# Patient Record
Sex: FEMALE | NOT HISPANIC OR LATINO | URBAN - METROPOLITAN AREA
[De-identification: names, ages, dates, MRNs, and addresses within clinical notes are randomized per-mention and may not be internally consistent; named-entity substitution may affect disease eponyms.]

---

## 2017-05-08 ENCOUNTER — OUTPATIENT (OUTPATIENT)
Dept: OUTPATIENT SERVICES | Facility: HOSPITAL | Age: 37
LOS: 1 days | End: 2017-05-08

## 2017-12-11 ENCOUNTER — RESULT REVIEW (OUTPATIENT)
Age: 37
End: 2017-12-11

## 2020-09-09 ENCOUNTER — APPOINTMENT (OUTPATIENT)
Dept: ULTRASOUND IMAGING | Facility: CLINIC | Age: 40
End: 2020-09-09

## 2020-09-09 ENCOUNTER — APPOINTMENT (OUTPATIENT)
Dept: MAMMOGRAPHY | Facility: CLINIC | Age: 40
End: 2020-09-09

## 2021-03-16 PROBLEM — Z00.00 ENCOUNTER FOR PREVENTIVE HEALTH EXAMINATION: Status: ACTIVE | Noted: 2021-03-16

## 2021-03-18 ENCOUNTER — APPOINTMENT (OUTPATIENT)
Dept: ULTRASOUND IMAGING | Facility: CLINIC | Age: 41
End: 2021-03-18

## 2021-03-18 ENCOUNTER — APPOINTMENT (OUTPATIENT)
Dept: MAMMOGRAPHY | Facility: CLINIC | Age: 41
End: 2021-03-18

## 2021-03-23 ENCOUNTER — APPOINTMENT (OUTPATIENT)
Dept: BREAST CENTER | Facility: CLINIC | Age: 41
End: 2021-03-23
Payer: COMMERCIAL

## 2021-03-23 VITALS
HEIGHT: 62 IN | SYSTOLIC BLOOD PRESSURE: 112 MMHG | WEIGHT: 117 LBS | TEMPERATURE: 98.4 F | BODY MASS INDEX: 21.53 KG/M2 | HEART RATE: 72 BPM | DIASTOLIC BLOOD PRESSURE: 69 MMHG

## 2021-03-23 DIAGNOSIS — N60.02 SOLITARY CYST OF RIGHT BREAST: ICD-10-CM

## 2021-03-23 DIAGNOSIS — Z87.42 PERSONAL HISTORY OF OTHER DISEASES OF THE FEMALE GENITAL TRACT: ICD-10-CM

## 2021-03-23 DIAGNOSIS — Z78.9 OTHER SPECIFIED HEALTH STATUS: ICD-10-CM

## 2021-03-23 DIAGNOSIS — N60.01 SOLITARY CYST OF RIGHT BREAST: ICD-10-CM

## 2021-03-23 DIAGNOSIS — Z72.89 OTHER PROBLEMS RELATED TO LIFESTYLE: ICD-10-CM

## 2021-03-23 DIAGNOSIS — Z80.41 FAMILY HISTORY OF MALIGNANT NEOPLASM OF OVARY: ICD-10-CM

## 2021-03-23 DIAGNOSIS — R92.8 OTHER ABNORMAL AND INCONCLUSIVE FINDINGS ON DIAGNOSTIC IMAGING OF BREAST: ICD-10-CM

## 2021-03-23 DIAGNOSIS — Z80.1 FAMILY HISTORY OF MALIGNANT NEOPLASM OF TRACHEA, BRONCHUS AND LUNG: ICD-10-CM

## 2021-03-23 DIAGNOSIS — Z80.3 FAMILY HISTORY OF MALIGNANT NEOPLASM OF BREAST: ICD-10-CM

## 2021-03-23 DIAGNOSIS — Z63.5 DISRUPTION OF FAMILY BY SEPARATION AND DIVORCE: ICD-10-CM

## 2021-03-23 PROCEDURE — 19001 PUNCTURE ASPIR CYST BRST EA: CPT

## 2021-03-23 PROCEDURE — 99243 OFF/OP CNSLTJ NEW/EST LOW 30: CPT | Mod: 25

## 2021-03-23 PROCEDURE — 19000 PUNCTURE ASPIR CYST BREAST: CPT

## 2021-03-23 PROCEDURE — 99072 ADDL SUPL MATRL&STAF TM PHE: CPT

## 2021-03-23 RX ORDER — ALPRAZOLAM 0.25 MG/1
0.25 TABLET ORAL
Refills: 0 | Status: ACTIVE | COMMUNITY

## 2021-03-23 SDOH — SOCIAL STABILITY - SOCIAL INSECURITY: DISRUPTION OF FAMILY BY SEPARATION AND DIVORCE: Z63.5

## 2021-03-23 NOTE — ASSESSMENT
[FreeTextEntry1] : 40 year old female referred for consultation for abnormal u/s per Dr. Francisco Rodriguez.  Pt is able to feel a large breast lump which is uncomfortable, however she doesn't usually do BSE because it makes her anxious and they are very lumpy. She also has recently noticed a small lump superficial to the larger right one. \par \par 3/11/21 SB ELIH, Micheal dMMG: cluster of microcalc, innumerable masslike foci in both breasts, largest in R 12:00 augustin to area of clinical cancer, heterogeneously dense, rec 6 mos R U/S, BR3\par 3/11/21 SB ELIH, Micheal U/S: R approx 10:00 cyst with debris at area of interest just beneath nipple 5.6x4.5x2.1cm, 12:00 8mm cyst roughly same location, another 8mm focus which might be a smooth solid mass or complex cyst, 1:00 1.2cm cyst 3N, 5:00 2.2cm cyst 2N, L 1:00 1N 1.8cm cyst, 2:00 1N 5mm hypoechoic nodule, 6:00 couple of cysts 1N 1.9cm and 2.2cm, rec 6 mos. R U/S, BR3 \par \par Fhx:breast ca: mAunt x2-50's (one of them also had blood cancer), Mcousin 30's (also had lung cancer), Ovarian cancer MGM 84, Lung, MGF 80, MGM ?age, cervical Mother 51 y/o, Muncle ? age.  Pt has one daughter and one son. No fhx of genetic testing. \par Pt meets NCCN criteria. \par CBE: Large palpable right mass corresp to cyst. Left 1:00 and 6:00 cysts slightly palpable. NO axillary or SC lymphadenopathy.\par Reviewed option of aspiration of cyst, pt desires aspiration of the larger right one and adjacent 1:00 cyst.  40 ml asp of large cyst and 1 ml of smaller cyst 1:00, Right, full resolutions.  S/p aspiration of cyst x2.\par Reviewed Fhx and rec was for genetic testing. \par TC score also calculated and found to be 14.6 LT risk.

## 2021-03-23 NOTE — PAST MEDICAL HISTORY
[Menarche Age ____] : age at menarche was [unfilled] [Total Preg ___] : G[unfilled] [Living ___] : Living: [unfilled] [Age At Live Birth ___] : Age at live birth: [unfilled] [FreeTextEntry6] : no [FreeTextEntry7] : no [FreeTextEntry8] : no

## 2021-03-23 NOTE — HISTORY OF PRESENT ILLNESS
[FreeTextEntry1] : 40 year old female referred for consultation for abnormal u/s per Dr. Francisco Rodriguez.  Pt is able to feel a large breast lump, however she doesn't usually do BSE because it makes her anxious and they are very lumpy.\par \par 3/11/21 SB ELIH, Micheal dMMG: cluster of microcalc, innumerable masslike foci in both breasts, largest in R 12:00 augustin to area of clinical cancer, heterogeneously dense, rec 6 mos R U/S, BR3\par 3/11/21 SB ELIH, Micheal U/S: R approx 10:00 cyst with debris at area of interest just beneath nipple 5.6x4.5x2.1cm, 12:00 8mm cyst roughly same location, another 8mm focus which might be a smooth solid mass or complex cyst, 1:00 1.2cm cyst 3N, 5:00 2.2cm cyst 2N, L 1:00 1N 1.8cm cyst, 2:00 1N 5mm hypoechoic nodule, 6:00 couple of cysts 1N 1.9cm and 2.2cm, rec 6 mos. R U/S, BR3 \par \par Fhx:breast ca: mAunt x2-50's (one of them also had blood cancer), Mcousin 30's (also had lung cancer), Ovarian cancer MGM 84, Lung, MGF 80, MGM ?age, cervical Mother 49 y/o, Muncle ? age.  Pt has one daughter and one son. No fhx of genetic testing. \par Pt meets NCCN criteria. \par \par \par

## 2021-03-23 NOTE — PROCEDURE
[FreeTextEntry1] : Right breast cyst aspiration [FreeTextEntry2] : cyst [FreeTextEntry3] : Procedure for aspiration was discussed with patient and consent was signed.  The R breast was prepped with alcohol.  A 21 gauge needle used for lidocaine injection 1 ml, and 18 gauge used for aspiration of palpable cyst of 40 ml brownish nonbloody fluid, non suspicious with full resolution of the cyst and discarded. The aspiration also included the superficial new "lump" which was also found to be a cyst.  Additional aspiration of the 1:00 cyst done with yellowish fluid and resolution.  The patient tolerated the procedure well and hemostasis was excellent. A bandage was placed over the site.  Discussed cyst may reform and she may additional cysts in the future. \par

## 2021-03-23 NOTE — PHYSICAL EXAM
[Bra Size: ___] : Bra Size: [unfilled] [Normocephalic] : normocephalic [Atraumatic] : atraumatic [Supple] : supple [No Supraclavicular Adenopathy] : no supraclavicular adenopathy [Examined in the supine and seated position] : examined in the supine and seated position [Asymmetrical] : asymmetrical [No dominant masses] : no dominant masses left breast [No Nipple Retraction] : no left nipple retraction [No Nipple Discharge] : no left nipple discharge [No Axillary Lymphadenopathy] : no left axillary lymphadenopathy [No Edema] : no edema [No Swelling] : no swelling [Full ROM] : full range of motion [No Rashes] : no rashes [No Ulceration] : no ulceration [de-identified] : 10-12:00 large breast mass corresponds to cyst.  [de-identified] : Left 1:00 and 6:00 mass corresponds to cyst.

## 2021-03-23 NOTE — DATA REVIEWED
[FreeTextEntry1] : 3/11/21 Micheal DOYLE dMMG: cluster of microcalc, innumerable masslike foci in both breasts, largest in R 12:00 augustin to area of clinical cancer, heterogeneously dense, rec 6 mos R U/S, BR3\par 3/11/21 CHRISTIN GARZA Micheal U/S: R approx 10:00 cyst with debris at area of interest just beneath nipple 5.6x4.5x2.1cm, 12:00 8mm cyst roughly same location, another 8mm focus which might be a smooth solid mass or complex cyst, 1:00 1.2cm cyst 3N, 5:00 2.2cm cyst 2N, L 1:00 1N 1.8cm cyst, 2:00 1N 5mm hypoechoic nodule, 6:00 couple of cysts 1N 1.9cm and 2.2cm, rec 6 mos. R U/S, BR3 \par \par

## 2021-03-23 NOTE — REVIEW OF SYSTEMS
[Anxiety] : anxiety [Negative] : Heme/Lymph [Breast Pain] : breast pain [Breast Lump] : breast lump [de-identified] : A

## 2021-03-31 ENCOUNTER — NON-APPOINTMENT (OUTPATIENT)
Age: 41
End: 2021-03-31

## 2021-09-03 ENCOUNTER — RESULT REVIEW (OUTPATIENT)
Age: 41
End: 2021-09-03

## 2021-10-15 ENCOUNTER — APPOINTMENT (OUTPATIENT)
Dept: ULTRASOUND IMAGING | Facility: CLINIC | Age: 41
End: 2021-10-15
Payer: COMMERCIAL

## 2021-10-15 ENCOUNTER — RESULT REVIEW (OUTPATIENT)
Age: 41
End: 2021-10-15

## 2021-10-15 PROCEDURE — 76642 ULTRASOUND BREAST LIMITED: CPT | Mod: RT

## 2021-11-23 ENCOUNTER — APPOINTMENT (OUTPATIENT)
Dept: BREAST CENTER | Facility: CLINIC | Age: 41
End: 2021-11-23

## 2021-11-23 NOTE — HISTORY OF PRESENT ILLNESS
[FreeTextEntry1] : 41 year old [facility] panel negative female here for f/u for abnormal u/s. \par Pt is able to feel a large breast lump which is uncomfortable, however she doesn't usually do BSE because it makes her anxious and they are very lumpy. She also has recently noticed a small lump superficial to the larger right one. \par referred by Dr. Francisco Rodriguez\par \par 3/11/21 SB ELIH, Micheal dMMG: cluster of microcalc, innumerable masslike foci in both breasts, largest in R 12:00 augustin to area of clinical cancer, heterogeneously dense, rec 6 mos R U/S, BR3\par 3/11/21 SB ELIH, Micheal U/S: R approx 10:00 cyst with debris at area of interest just beneath nipple 5.6x4.5x2.1cm, 12:00 8mm cyst roughly same location, another 8mm focus which might be a smooth solid mass or complex cyst, 1:00 1.2cm cyst 3N, 5:00 2.2cm cyst 2N, L 1:00 1N 1.8cm cyst, 2:00 1N 5mm hypoechoic nodule, 6:00 couple of cysts 1N 1.9cm and 2.2cm, rec 6 mos. R U/S, BR3 \par 10/15/21, NFR, R U/S: 1.7x1.1cm lobulated cyst 12:00 1N inc from 3/21, [id number] cyst 10:00 1N dec from 3/21, 8x6mm cyst w debris at 11:00 1N, 2.2x0.8x2.8cm cyst 6:00 1N, rec 6 mos f/u U/S, BR3\par \par Fhx:breast ca: mAunt x2-50's (one of them also had blood cancer), Mcousin 30's (also had lung cancer), Ovarian cancer MGM 86, Lung, MGF 80, MGM ?age, cervical Mother 49 y/o, Muncle ? age. Pt has one daughter and one son. No fhx of genetic testing. \par Pt meets NCCN criteria. \par CBE: Large palpable right mass corresp to cyst. Left 1:00 and 6:00 cysts slightly palpable. NO axillary or SC lymphadenopathy.\par Reviewed option of aspiration of cyst, pt desires aspiration of the larger right one and adjacent 1:00 cyst. 40 ml asp of large cyst and 1 ml of smaller cyst 1:00, Right, full resolutions. S/p aspiration of cyst x2.\par Reviewed Fhx and rec was for genetic testing. \par TC score also calculated and found to be 14.6 LT risk. \par \par  \par Plan\par S/p cyst aspiration of right breast.\par [facility] genetic testing today.\par Further management pending results.\par Pt will also for 6 mos f.u bilat u/s 9/21 then f.u NP. \par F.u sooner if cysts recur, enlarge or PRN.

## 2022-02-04 ENCOUNTER — EMERGENCY (EMERGENCY)
Facility: HOSPITAL | Age: 42
LOS: 1 days | Discharge: HOME | End: 2022-02-04
Admitting: EMERGENCY MEDICINE
Payer: MEDICAID

## 2022-02-04 PROCEDURE — 93010 ELECTROCARDIOGRAM REPORT: CPT

## 2022-02-04 PROCEDURE — 99284 EMERGENCY DEPT VISIT MOD MDM: CPT

## 2022-02-07 DIAGNOSIS — R00.2 PALPITATIONS: ICD-10-CM

## 2023-05-26 ENCOUNTER — APPOINTMENT (OUTPATIENT)
Dept: MAMMOGRAPHY | Facility: CLINIC | Age: 43
End: 2023-05-26

## 2023-05-26 ENCOUNTER — APPOINTMENT (OUTPATIENT)
Dept: ULTRASOUND IMAGING | Facility: CLINIC | Age: 43
End: 2023-05-26
Payer: MEDICAID

## 2023-05-26 PROCEDURE — 76641 ULTRASOUND BREAST COMPLETE: CPT | Mod: 50

## 2023-05-26 PROCEDURE — 77062 BREAST TOMOSYNTHESIS BI: CPT

## 2023-05-26 PROCEDURE — 77066 DX MAMMO INCL CAD BI: CPT

## 2023-06-02 ENCOUNTER — APPOINTMENT (OUTPATIENT)
Dept: ULTRASOUND IMAGING | Facility: CLINIC | Age: 43
End: 2023-06-02

## 2023-06-02 ENCOUNTER — OUTPATIENT (OUTPATIENT)
Dept: OUTPATIENT SERVICES | Facility: HOSPITAL | Age: 43
LOS: 1 days | End: 2023-06-02
Payer: MEDICAID

## 2023-06-02 DIAGNOSIS — Z00.8 ENCOUNTER FOR OTHER GENERAL EXAMINATION: ICD-10-CM

## 2023-06-02 PROCEDURE — A4648: CPT

## 2023-06-02 PROCEDURE — 88341 IMHCHEM/IMCYTCHM EA ADD ANTB: CPT

## 2023-06-02 PROCEDURE — 88360 TUMOR IMMUNOHISTOCHEM/MANUAL: CPT | Mod: 26

## 2023-06-02 PROCEDURE — 88342 IMHCHEM/IMCYTCHM 1ST ANTB: CPT

## 2023-06-02 PROCEDURE — 77065 DX MAMMO INCL CAD UNI: CPT | Mod: 26,LT

## 2023-06-02 PROCEDURE — 88305 TISSUE EXAM BY PATHOLOGIST: CPT

## 2023-06-02 PROCEDURE — 19083 BX BREAST 1ST LESION US IMAG: CPT | Mod: LT

## 2023-06-02 PROCEDURE — 88342 IMHCHEM/IMCYTCHM 1ST ANTB: CPT | Mod: 26

## 2023-06-02 PROCEDURE — 88360 TUMOR IMMUNOHISTOCHEM/MANUAL: CPT

## 2023-06-02 PROCEDURE — 19083 BX BREAST 1ST LESION US IMAG: CPT

## 2023-06-02 PROCEDURE — 88305 TISSUE EXAM BY PATHOLOGIST: CPT | Mod: 26

## 2023-06-02 PROCEDURE — 77065 DX MAMMO INCL CAD UNI: CPT

## 2023-06-02 PROCEDURE — 88341 IMHCHEM/IMCYTCHM EA ADD ANTB: CPT | Mod: 26

## 2023-06-13 ENCOUNTER — APPOINTMENT (OUTPATIENT)
Dept: BREAST CENTER | Facility: CLINIC | Age: 43
End: 2023-06-13
Payer: MEDICAID

## 2023-06-13 VITALS
WEIGHT: 110 LBS | DIASTOLIC BLOOD PRESSURE: 75 MMHG | HEART RATE: 68 BPM | OXYGEN SATURATION: 98 % | BODY MASS INDEX: 20.24 KG/M2 | SYSTOLIC BLOOD PRESSURE: 113 MMHG | HEIGHT: 62 IN

## 2023-06-13 PROCEDURE — 99215 OFFICE O/P EST HI 40 MIN: CPT

## 2023-06-19 ENCOUNTER — APPOINTMENT (OUTPATIENT)
Dept: PLASTIC SURGERY | Facility: CLINIC | Age: 43
End: 2023-06-19
Payer: MEDICAID

## 2023-06-19 VITALS
HEIGHT: 62 IN | BODY MASS INDEX: 20.24 KG/M2 | TEMPERATURE: 98.3 F | OXYGEN SATURATION: 100 % | RESPIRATION RATE: 16 BRPM | DIASTOLIC BLOOD PRESSURE: 74 MMHG | SYSTOLIC BLOOD PRESSURE: 118 MMHG | WEIGHT: 110 LBS | HEART RATE: 73 BPM

## 2023-06-19 DIAGNOSIS — Z87.891 PERSONAL HISTORY OF NICOTINE DEPENDENCE: ICD-10-CM

## 2023-06-19 DIAGNOSIS — I49.9 CARDIAC ARRHYTHMIA, UNSPECIFIED: ICD-10-CM

## 2023-06-19 PROCEDURE — 99204 OFFICE O/P NEW MOD 45 MIN: CPT

## 2023-06-23 ENCOUNTER — APPOINTMENT (OUTPATIENT)
Dept: MRI IMAGING | Facility: CLINIC | Age: 43
End: 2023-06-23
Payer: MEDICAID

## 2023-06-23 PROCEDURE — A9579: CPT

## 2023-06-23 PROCEDURE — A9585: CPT

## 2023-06-23 PROCEDURE — 77049 MRI BREAST C-+ W/CAD BI: CPT

## 2023-06-26 ENCOUNTER — NON-APPOINTMENT (OUTPATIENT)
Age: 43
End: 2023-06-26

## 2023-06-26 DIAGNOSIS — R92.8 OTHER ABNORMAL AND INCONCLUSIVE FINDINGS ON DIAGNOSTIC IMAGING OF BREAST: ICD-10-CM

## 2023-06-28 ENCOUNTER — NON-APPOINTMENT (OUTPATIENT)
Age: 43
End: 2023-06-28

## 2023-06-30 PROBLEM — Z87.891 HISTORY OF USE OF NICOTINE CONTAINING SUBSTANCE IN COMBUSTION-FREE VAPORIZATION DEVICE: Status: ACTIVE | Noted: 2023-06-30

## 2023-06-30 NOTE — HISTORY OF PRESENT ILLNESS
[FreeTextEntry1] : This is a 44 yo woman referred by Dr. 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 \par MRI is planned for later this week.\par Pt reports she desires bilateral mastectomy, although she says she really just wishes she didn't have to deal with this at all.\par She also reports she would love to have a tummy tuck at the same time as she has been thinking about it for a while.

## 2023-06-30 NOTE — ASSESSMENT
[FreeTextEntry1] : Bilateral mastectomy is being planned, and pt is here for consultation regarding breast reconstruction.\par We discussed the spectrum of reconstructive options, including no reconstruction, implant based reconstruction and autologous reconstruction. We discussed the risk/benefit ratio for each of these options.\par We discussed at length the R/B/A of implant based reconstruction, including the need for a two-stage reconstruction. We discussed that implants are not considered lifetime devices, and may need to be replaced in the future. We discussed the risks of infection requiring implant removal, rupture, capsular contracture and implant exposure (especially following radiation).\par We briefly discussed the R/B/A of abdominally based autologous reconstruction, however, she does not have adequate tissue for this and does not want the added recovery of an autologous flap/second or third surgical site.\par \par She would like to proceed with implant based reconstruction. We discussed long term monitoring of implants for rupture and other concerns. We also discussed nipple sparing mastectomy; that the NAC may need to be removed intraoperatively if the perfusion is not good, or postoperatively if the pathology reports reveals disease too close to or involving the retro areolar tissue. I will review this with Dr. Mccarthy.\par \par We reviewed pain management. I recommend alternating Tylenol and ibuprofen every 4 hours (8 hours between doses of each) and then adding oxycodone for breakthrough pain as needed. Pt reported their understanding. \par \par We reviewed drain care. They were allowed to practice with a sample drain and will be given a chart to record output. This includes reminder instructions as well. They were able to return demonstrate the process of emptying the drain.\par I will coordinate the OR timing with Dr. Mccarthy.\par

## 2023-06-30 NOTE — PHYSICAL EXAM
[NI] : Normal [Bra Size: _______] : Bra Size: [unfilled] [de-identified] : Anxious [de-identified] : Left breast with a palpable mass in the lateral aspect of the lower pole.\par It is about 2-3 cm wide and is mobile with respect to the chest wall. \par It is somewhat tethered to the skin though.\par Breasts have involutional change and very little volume.\par No ptosis. [de-identified] : Excellent muscle tone.\par Minimal adipose.\par Excess skin and striae. Skin drapes below the umbilicus on either side of the midline in the way of a garland or bunting. \par Tissue is not adequate for flap reconstruction. [de-identified] : Some excess adipose noted in lateral hips, thighs and buttocks. [de-identified] : Alternately in denial vs resigned to any treatment required.

## 2023-06-30 NOTE — REVIEW OF SYSTEMS
[Anxiety] : anxiety [Negative] : Respiratory [FreeTextEntry2] : Stressed about her diagnosis and losing weight. [FreeTextEntry7] : Poor appetite. Worried she might have an ulcer due to stress. She plans on seeing Dr. Rodriguez for this. [de-identified] : Stress, as above.

## 2023-06-30 NOTE — REASON FOR VISIT
[Consultation] : a consultation visit [FreeTextEntry1] : Patient states that on May 20th 2023, She noticed a lump and irritation on her right breast.

## 2023-07-07 ENCOUNTER — RESULT REVIEW (OUTPATIENT)
Age: 43
End: 2023-07-07

## 2023-07-07 ENCOUNTER — APPOINTMENT (OUTPATIENT)
Dept: ULTRASOUND IMAGING | Facility: CLINIC | Age: 43
End: 2023-07-07
Payer: MEDICAID

## 2023-07-07 PROCEDURE — 76642 ULTRASOUND BREAST LIMITED: CPT | Mod: RT

## 2023-07-20 ENCOUNTER — NON-APPOINTMENT (OUTPATIENT)
Age: 43
End: 2023-07-20

## 2023-07-21 ENCOUNTER — RESULT REVIEW (OUTPATIENT)
Age: 43
End: 2023-07-21

## 2023-07-21 ENCOUNTER — APPOINTMENT (OUTPATIENT)
Dept: PLASTIC SURGERY | Facility: HOSPITAL | Age: 43
End: 2023-07-21
Payer: MEDICAID

## 2023-07-21 ENCOUNTER — APPOINTMENT (OUTPATIENT)
Dept: BREAST CENTER | Facility: HOSPITAL | Age: 43
End: 2023-07-21

## 2023-07-21 PROCEDURE — 19357 TISS XPNDR PLMT BRST RCNSTJ: CPT | Mod: 50

## 2023-07-21 PROCEDURE — 15777 ACELLULAR DERM MATRIX IMPLT: CPT | Mod: 50

## 2023-07-21 PROCEDURE — 15860 IV NJX TST VASC FLO FLAP/GRF: CPT

## 2023-07-25 ENCOUNTER — APPOINTMENT (OUTPATIENT)
Dept: PLASTIC SURGERY | Facility: CLINIC | Age: 43
End: 2023-07-25
Payer: MEDICAID

## 2023-07-25 VITALS
SYSTOLIC BLOOD PRESSURE: 118 MMHG | BODY MASS INDEX: 20.61 KG/M2 | HEART RATE: 69 BPM | RESPIRATION RATE: 16 BRPM | OXYGEN SATURATION: 99 % | WEIGHT: 112 LBS | DIASTOLIC BLOOD PRESSURE: 70 MMHG | HEIGHT: 62 IN | TEMPERATURE: 98.4 F

## 2023-07-25 PROCEDURE — 99024 POSTOP FOLLOW-UP VISIT: CPT

## 2023-07-25 NOTE — ASSESSMENT
[FreeTextEntry1] : 44 y/o female POD # 4 s/p Bilateral mastectomies with placement of tissue expanders\par \par Healing well\par Patient's AGUSTIN drains are putting out approximately 50 cc per day for the past two days.  Explained that the goal is 30 or less for 24 hour total for two days in a row.  Will keep in touch with patient to see if drains can be removed this Thursday;continue to empty and record\par Follow ups weekly for the next three weeks \par Miralax for constipation, and increase po fluids\par Ambulate, go for walks, advised that after drain removal no lifting anything > 10 lbs, no straining, but keep arms mobile\par All questions and concerns addressed\par Plan and patient status as per Dr Dunn

## 2023-07-25 NOTE — REASON FOR VISIT
[Follow-Up: _____] : a [unfilled] follow-up visit [FreeTextEntry1] : Patient states her right drain hurts. She last took tylenol at 7 am.

## 2023-07-25 NOTE — PHYSICAL EXAM
[de-identified] : Well kempt with makeup on. In fairly good spirits [de-identified] : Bilateral tegaderm dressings in place, with no drainage noted, the Bob are in place, incisions are C/D/I, point tenderness at right chest wall where drain is entering the chest next to the expander.  Drain is working and milked, tegaderm replaced around the drain.\par No erythema, edema, or ecchymotic areas noted.  Nipples are viable, pink bilaterally.

## 2023-07-25 NOTE — HISTORY OF PRESENT ILLNESS
[FreeTextEntry1] : "My right drain site is causing me pain"\par \par Patient presents s/p nipple sparing bilateral mastectomies with placement of tissue expanders on 7/21/23.  Patient states she is feeling better today, but is constipated and has some discomfort at the right drain site.  She states that there is tenderness that causes stabbing pain and has made it difficult to sleep.  She states she has been recoding drain output and has been taking tylenol and motrin for the discomfort. No other complaints at this time. \par She would rather not take the oxycodone.

## 2023-07-25 NOTE — ADDENDUM
[FreeTextEntry1] : All medical record entries were at my direction and personally dictated by me (Dr. Charla Dunn). I have reviewed the chart and agree that the record accurately reflects my personal performance of the history, physical exam, assessment and plan.

## 2023-07-27 ENCOUNTER — APPOINTMENT (OUTPATIENT)
Dept: PLASTIC SURGERY | Facility: CLINIC | Age: 43
End: 2023-07-27
Payer: MEDICAID

## 2023-07-27 ENCOUNTER — NON-APPOINTMENT (OUTPATIENT)
Age: 43
End: 2023-07-27

## 2023-07-27 VITALS
WEIGHT: 112 LBS | TEMPERATURE: 98.5 F | OXYGEN SATURATION: 98 % | BODY MASS INDEX: 20.61 KG/M2 | RESPIRATION RATE: 16 BRPM | DIASTOLIC BLOOD PRESSURE: 60 MMHG | HEART RATE: 67 BPM | HEIGHT: 62 IN | SYSTOLIC BLOOD PRESSURE: 90 MMHG

## 2023-07-27 PROCEDURE — 99024 POSTOP FOLLOW-UP VISIT: CPT

## 2023-07-27 RX ORDER — OXYCODONE 5 MG/1
5 TABLET ORAL EVERY 6 HOURS
Qty: 5 | Refills: 0 | Status: DISCONTINUED | COMMUNITY
Start: 2023-07-22 | End: 2023-07-27

## 2023-08-01 NOTE — ADDENDUM
[FreeTextEntry1] : 6/23/23 [facility], MRI: R- far posterior UOQ R breast is an enhancing focus measuring 8mm. While this may represent artifactual background enhancement given the focal appearance targeted US is rec. In the absence of finding on US this is felt to be a benign finding. No addl site of susp enhancement R breast. L- Signal void from tissue marker placed at bx is seen on axial image 129. The marker is within an enhancing mass measuring 2.3cm (craniocaudad dimension) and 2cm (mediolateral dimension). This represents the bx proven malignancy. This is located in the lateral aspect approx 5:00. No addl sites of susp enhancement in the L breast. Axilla- no significant axillary or internal mammary lymphadenopathy. Impression: Focal enhancement far posterior UOQ of R breast may represent artifact. Targeted US is rec. In the absence of US findings this would be felt to be benign. Bx proven malignancy left breast without evidence of multifocal or multicentric dx. Rec addl imaging. BR0   7/7/23 NFR, R US: No susp solid mass. Scattered cysts are seen. No mass to correlate with MRI finding. MRI findings are felt to be artifactual. Rec surgical or oncological management. BR2  7/21/23 Surgical path: Right breast SA, negative, mastectomy: papilloma, Left SA is negative. Left mastectomy, 2.1 cm DCIS, IDC .5 mm noted, all margins negative, DCIS closest is 2 mm. ER/RI on core bx was negative/negative. Receptors on microinvasion: ER- 0%, RI- 0%, Her2 positive.

## 2023-08-01 NOTE — PHYSICAL EXAM
[Normocephalic] : normocephalic [Atraumatic] : atraumatic [Supple] : supple [No Supraclavicular Adenopathy] : no supraclavicular adenopathy [No Thyromegaly] : no thyromegaly [Examined in the supine and seated position] : examined in the supine and seated position [Bra Size: ___] : Bra Size: [unfilled] [No Nipple Retraction] : no left nipple retraction [No Nipple Discharge] : no left nipple discharge [No Axillary Lymphadenopathy] : no left axillary lymphadenopathy [No Edema] : no edema [No Swelling] : no swelling [Full ROM] : full range of motion [No Rashes] : no rashes [No Ulceration] : no ulceration [de-identified] : 6:00 palpable mass, cyst. Left 5:00 3N mass corresp to biopsied DCIS. Breast with multiple smaller nodularity limits CBE.

## 2023-08-01 NOTE — HISTORY OF PRESENT ILLNESS
[FreeTextEntry1] : Referred by Dr. Francisco Rodriguez.\par \par 43 year old, myriad negative, female here today after recent biopsy L 5:00 breast bx showed DCIS, ER/MN negative.\par Patient notes feeling a palpable lump approx 1 month ago which prompted her to have imaging. \par \par 3/11/21 SB ELIH, Micheal dMMG: cluster of microcalc, innumerable masslike foci in both breasts, largest in R 12:00 augustin to area of clinical cancer, heterogeneously dense, rec 6 mos R U/S, BR3\par 3/11/21 SB ELIH, Micheal U/S: R approx 10:00 cyst with debris at area of interest just beneath nipple 5.6x4.5x2.1cm, 12:00 8mm cyst roughly same location, another 8mm focus which might be a smooth solid mass or complex cyst, 1:00 1.2cm cyst 3N, 5:00 2.2cm cyst 2N, L 1:00 1N 1.8cm cyst, 2:00 1N 5mm hypoechoic nodule, 6:00 couple of cysts 1N 1.9cm and 2.2cm, rec 6 mos. R U/S, BR3 \par 10/15/21 NFR, R US: There is a 1.7 x 1.1 cm lobulated cyst at 12:00 o'clock, 1cmfn which is increased from 3/11/2021 report. There is an 8 x 6 x 9 mm cyst at 10:00 o'clock, 1 cmfn which is decreased from 3/11/2021 report. There is an 8 x 6 mm cyst with debris at 11:00 o'clock, 1cmfn There is a 2.2 x 0.8 x 2.8 cm cyst at 6:00 o'clock, 1cmfn. Rec US in 6 mos. BR3\par \par 5/26/23 NFR, bilat sMMG and US: TC 21.6%. Extremely dense. Palpable lump corresponds to an ill-defined density associated with pleomorphic calcs. There are bilateral well-circumscribed masses in this patient with known cysts. US: R- No suspicious solid mass. Numerous cysts are identified throughout the right breast with the largest measuring up to 4cm. L- In the 5:00 left breast is a 17mm hypoechoic mass with internal calcs corresponding to the mmg and palpable findings. Numerous cysts are identified throughout the left breast. Rec bx of palpable mass 5:00 L breast. BR4B\par \par 6/2/23 Larry, US guided bx L breast 5:00: DCIS G2-3, ER negative 0%, MN negative 0% papillary, solid cribriform pattern with lobular extension, comedo necrosis and calcs. Some area are suggestive of associated papillary lesions. Immunostains for P63 and smooth muscle myosin heavy chain highlight the myoepithelial cells, support the in situ nature of the lesion. Concordant. Rec surgical or oncological management. \par \par Fhx:breast ca: mAunt x2-50's (one of them also had blood cancer), Mcousin 30's (also had lung cancer), Ovarian cancer MGM 84, Lung, MGF 80, MGM ?age, cervical Mother 49 y/o, Muncle ? age. Pt has one daughter and one son.

## 2023-08-01 NOTE — CONSULT LETTER
[Dear  ___] : Dear  [unfilled], [Consult Letter:] : I had the pleasure of evaluating your patient, [unfilled]. [Please see my note below.] : Please see my note below. [Consult Closing:] : Thank you very much for allowing me to participate in the care of this patient.  If you have any questions, please do not hesitate to contact me. [Sincerely,] : Sincerely, [FreeTextEntry3] : Jacklyn Mccarthy MD

## 2023-08-01 NOTE — DATA REVIEWED
[FreeTextEntry1] : 5/26/23 NFR, bilat sMMG and US: TC 21.6%. Extremely dense. Palpable lump corresponds to an ill-defined density associated with pleomorphic calcs. There are bilateral well-circumscribed masses in this patient with known cysts. US: R- No suspicious solid mass. Numerous cysts are identified throughout the right breast with the largest measuring up to 4cm. L- In the 5:00 left breast is a 17mm hypoechoic mass with internal calcs corresponding to the mmg and palpable findings. Numerous cysts are identified throughout the left breast. Rec bx of palpable mass 5:00 L breast. BR4B\par \par 6/2/23 Larry, US guided bx L breast 5:00: DCIS G2-3, ER negative 0%, MI negative 0% papillary, solid cribriform pattern with lobular extension, comedo necrosis and calcs. Some area are suggestive of associated papillary lesions. Immunostains for P63 and smooth muscle myosin heavy chain highlight the myoepithelial cells, support the in situ nature of the lesion. Concordant. Rec surgical or oncological management.

## 2023-08-01 NOTE — ASSESSMENT
[FreeTextEntry1] : Referred by Dr. Francisco Rodriguez.\par \par 43 year old, myriad negative, female here today after recent biopsy L 5:00 breast bx showed DCIS, ER/RI negative.\par Patient notes feeling a palpable lump approx 1 month ago which prompted her to have imaging. \par \par 3/11/21 SB ELIH, Micheal dMMG: cluster of microcalc, innumerable masslike foci in both breasts, largest in R 12:00 augustin to area of clinical cancer, heterogeneously dense, rec 6 mos R U/S, BR3\par 3/11/21 SB ELIH, Micheal U/S: R approx 10:00 cyst with debris at area of interest just beneath nipple 5.6x4.5x2.1cm, 12:00 8mm cyst roughly same location, another 8mm focus which might be a smooth solid mass or complex cyst, 1:00 1.2cm cyst 3N, 5:00 2.2cm cyst 2N, L 1:00 1N 1.8cm cyst, 2:00 1N 5mm hypoechoic nodule, 6:00 couple of cysts 1N 1.9cm and 2.2cm, rec 6 mos. R U/S, BR3 \par 10/15/21 NFR, R US: There is a 1.7 x 1.1 cm lobulated cyst at 12:00 o'clock, 1cmfn which is increased from 3/11/2021 report. There is an 8 x 6 x 9 mm cyst at 10:00 o'clock, 1 cmfn which is decreased from 3/11/2021 report. There is an 8 x 6 mm cyst with debris at 11:00 o'clock, 1cmfn There is a 2.2 x 0.8 x 2.8 cm cyst at 6:00 o'clock, 1cmfn. Rec US in 6 mos. BR3\par \par 5/26/23 NFR, bilat sMMG and US: TC 21.6%. Extremely dense. Palpable lump corresponds to an ill-defined density associated with pleomorphic calcs. There are bilateral well-circumscribed masses in this patient with known cysts. US: R- No suspicious solid mass. Numerous cysts are identified throughout the right breast with the largest measuring up to 4cm. L- In the 5:00 left breast is a 17mm hypoechoic mass with internal calcs corresponding to the mmg and palpable findings. Numerous cysts are identified throughout the left breast. Rec bx of palpable mass 5:00 L breast. BR4B\par \par 6/2/23 Larry, US guided bx L breast 5:00: DCIS G2-3, ER negative 0%, RI negative 0% papillary, solid cribriform pattern with lobular extension, comedo necrosis and calcs. Some area are suggestive of associated papillary lesions. Immunostains for P63 and smooth muscle myosin heavy chain highlight the myoepithelial cells, support the in situ nature of the lesion. Concordant. Rec surgical or oncological management. \par \par Fhx:breast ca: mAunt x2-50's (one of them also had blood cancer), Mcousin 30's (also had lung cancer), Ovarian cancer MGM 84, Lung, MGF 80, MGM ?age, cervical Mother 51 y/o, Muncle ? age. Pt has one daughter and one son. \par Pt works as a  at [employer].\par \par CBE: 32 A 6:00 palpable mass, cyst. Left 5:00 3N mass corresp to biopsied DCIS. Breast with multiple smaller nodularity limits CBE. No axillary or SC lymphadenopathy.\par Long discussion with patient regarding the biopsy results from [facility] from 06/2/2023   showing DCIS of the L breast at the 5 o?clock, 3 cm FTN, Grade 3, ER/RI Neg, comedonec, measuring 1.7cm.  Reviewed extensively the options of mastectomy vs lumpectomy with the pros and cons for both.  \par With mastectomy, options of immediate or delayed reconstruction (expander/implant vs autologous flap) discussed and referral to plastic surgeon given. Pt is thin and may not be a candidate for KIMBERLY.  Will probably need a drain.  Also discussed usually no radiation with mastectomy but may need if positive margins or positive LNs if LN is indicated.  Discussed usually no reexcision with mastectomy. Also discussed Magtrace injection, including skin discoloration, for potential delayed SLN biopsy after mastectomy if microinvasion or invasion found on final pathology. \par With lumpectomy, will need radiation and treatment was reviewed.  May need localization.  Also discussed will need negative margins and if too close or positive, may need reexcision.   If unable to achieve negative margins with reexcision(s), She  may need mastectomy.  PT is an A cup and given size of breast and at least 1.7 cm area, cosmesis may not be optimal with initial lumpectomy. Will not need SLNBx with lumpectomy but if microinvasion or invasion found on final path, may need delayed SLNbx or axillary surgery.\par Discussed probably will not need hormonal therapy.  No chemotherapy for Stage 0, DCIS.  Prior genetic testing MYRIAD panel was negative. \par \par Pt is leaning towards left mastectomy and right mastectomy. She may be a candidate for NSM.\par Nipple sparing mastectomy risks: In additional to the usual risks of mastectomy, nipple sparing mastectomy carries potential additional risks of cancer recurrence/occurrence; although studied outcomes have been very good to date, there is limited long-term data available re: recurrence/occurrence rates whether or not they are equivalent with nipple sparing mastectomy. Would check SA tissue separately and if DCIS is found on final pathology, may need delayed removal of NAC. Finally one should be aware that the nipple will not retain sensation and will remain for the most part numb after nipple sparing mastectomy.\par Would rec MRI given limited CBE and to evaluate proximity to NAC of the DCIS. \par Pt to also see DR. Dunn about reconstruction. \par 
English

## 2023-08-02 ENCOUNTER — APPOINTMENT (OUTPATIENT)
Dept: PLASTIC SURGERY | Facility: CLINIC | Age: 43
End: 2023-08-02
Payer: MEDICAID

## 2023-08-02 ENCOUNTER — NON-APPOINTMENT (OUTPATIENT)
Age: 43
End: 2023-08-02

## 2023-08-02 VITALS
HEIGHT: 62 IN | TEMPERATURE: 98.6 F | RESPIRATION RATE: 16 BRPM | SYSTOLIC BLOOD PRESSURE: 90 MMHG | DIASTOLIC BLOOD PRESSURE: 64 MMHG | BODY MASS INDEX: 20.61 KG/M2 | WEIGHT: 112 LBS | OXYGEN SATURATION: 99 % | HEART RATE: 66 BPM

## 2023-08-02 PROCEDURE — 99024 POSTOP FOLLOW-UP VISIT: CPT

## 2023-08-02 NOTE — REASON FOR VISIT
[Follow-Up: _____] : a [unfilled] follow-up visit [FreeTextEntry1] : Patient states pain on the left side can't lift up arm.

## 2023-08-02 NOTE — ASSESSMENT
[FreeTextEntry1] : New pain at left TE, likely from pressure overnight. No hematoma or malposition. OK to go ahead with SLNBx with Dr. Mccarthy today. Follow up next week.

## 2023-08-02 NOTE — HISTORY OF PRESENT ILLNESS
[FreeTextEntry1] : Pt called this morning. She is due to have surgery with Dr. Mccarthy today (left sentinel lymph node biopsy), but she reports she slept on her left side and woke up with pain just lateral to the TE that radiated to her chest wall, back and left arm.

## 2023-08-02 NOTE — PHYSICAL EXAM
[de-identified] : Anxious [de-identified] : NL respiratory effort noted  [de-identified] : Bilateral steristrips in place, with no drainage noted, the Bob are in place, incisions are C/D/I, point tenderness at right chest wall where drain is entering the chest next to the expander.  Drain sites healing. No new hematoma or ecchymosis laterally. TE still in good position.

## 2023-08-03 ENCOUNTER — APPOINTMENT (OUTPATIENT)
Dept: BREAST CENTER | Facility: CLINIC | Age: 43
End: 2023-08-03

## 2023-08-07 ENCOUNTER — APPOINTMENT (OUTPATIENT)
Dept: PLASTIC SURGERY | Facility: CLINIC | Age: 43
End: 2023-08-07
Payer: MEDICAID

## 2023-08-07 VITALS
RESPIRATION RATE: 16 BRPM | BODY MASS INDEX: 20.43 KG/M2 | SYSTOLIC BLOOD PRESSURE: 92 MMHG | DIASTOLIC BLOOD PRESSURE: 60 MMHG | HEIGHT: 62 IN | WEIGHT: 111 LBS | HEART RATE: 63 BPM | TEMPERATURE: 98.3 F | OXYGEN SATURATION: 98 %

## 2023-08-07 DIAGNOSIS — Z86.19 PERSONAL HISTORY OF OTHER INFECTIOUS AND PARASITIC DISEASES: ICD-10-CM

## 2023-08-07 PROCEDURE — 99024 POSTOP FOLLOW-UP VISIT: CPT

## 2023-08-07 NOTE — PHYSICAL EXAM
[de-identified] : Well-dressed, well-kempt, but appears psychologically in distress. [de-identified] : bilateral tissue expanders in place, patient states right TE causes her some discomfort on her ribs, and the left TE is causing her to have difficulty with full range of motion in her arm and shoulder.  She also states she gets a weird feeling down her left arm on occasion as well. steri strips in place, C/D/I  nipples healthy and pink bilaterally  [de-identified] : s/p nipple sparing mastectomies

## 2023-08-07 NOTE — REASON FOR VISIT
[Follow-Up: _____] : a [unfilled] follow-up visit [FreeTextEntry1] : Patient states she is very overwhelmed with everything going on.

## 2023-08-07 NOTE — ADDENDUM
[FreeTextEntry1] : All medical record entries were at my direction and personally dictated by me (Dr. Charla Dunn). I have reviewed the chart and agree that the record accurately reflects my personal performance of the history, physical exam, assessment and plan.  Changes made to the note and assessment above as needed.

## 2023-08-07 NOTE — ASSESSMENT
[FreeTextEntry1] : Healing well postop. Advised pt to continue to utilize the services of her therapist to work through her feelings regarding her diagnosis and treatment. She is planning to proceed with the sentinel lymph node biopsy with Dr. Mccarthy on Thursday. She did see the cardiologist and knows the recommendation to take her metoprolol the day before, but not on the day of surgery. She is conflicted about taking the metoprolol as she had stopped taking it before since she did not think she needed it. She is not sure she wants to go through with the reconstruction either, but recognizes that she will need another surgery anyway, even if she just decides to remove the expanders. She reports that if she needs another surgery anyway, she might just go ahead with the second stage. I suggested that she not pressure herself to make that decision at this time,and allow herself some time to recover. Either way, I would not usually do the second stage until three months have passed from the first one.

## 2023-08-07 NOTE — HISTORY OF PRESENT ILLNESS
[FreeTextEntry1] : "I'm so done" Patient presents for follow up and is scheduled for her sentinel lymph node biopsy with Dr Mccarthy on Thursday.  She states that she is usually a very up-beat, positive person, but feels like she has hit a wall.  She states that she does not want to have surgery this week, that she doesnt know what to do about her tissue expanders.  SHe seems very upset and is concerned about many things.  She is speaking with a therapist, but seems that she is very frustrated about her current situation and diagnosis.

## 2023-08-10 ENCOUNTER — RESULT REVIEW (OUTPATIENT)
Age: 43
End: 2023-08-10

## 2023-08-10 ENCOUNTER — APPOINTMENT (OUTPATIENT)
Dept: BREAST CENTER | Facility: HOSPITAL | Age: 43
End: 2023-08-10

## 2023-08-15 ENCOUNTER — APPOINTMENT (OUTPATIENT)
Dept: PLASTIC SURGERY | Facility: CLINIC | Age: 43
End: 2023-08-15
Payer: MEDICAID

## 2023-08-15 VITALS
HEART RATE: 77 BPM | SYSTOLIC BLOOD PRESSURE: 114 MMHG | DIASTOLIC BLOOD PRESSURE: 75 MMHG | OXYGEN SATURATION: 98 % | HEIGHT: 62 IN | BODY MASS INDEX: 20.43 KG/M2 | RESPIRATION RATE: 16 BRPM | WEIGHT: 111 LBS | TEMPERATURE: 98 F

## 2023-08-15 DIAGNOSIS — Z42.1 ENCOUNTER FOR BREAST RECONSTRUCTION FOLLOWING MASTECTOMY: ICD-10-CM

## 2023-08-15 PROCEDURE — 99024 POSTOP FOLLOW-UP VISIT: CPT

## 2023-08-15 NOTE — REASON FOR VISIT
[Follow-Up: _____] : a [unfilled] follow-up visit [FreeTextEntry1] : Patient states she is still uncomfortable and lumpy some pain.

## 2023-08-15 NOTE — PHYSICAL EXAM
[de-identified] : Well-dressed, well-kempt, but appears psychologically in distress. [de-identified] : bilateral tissue expanders in place incisions healing well After alcohol prep, magnet localization and CHG prep, 25 ml fill performed without difficulty. Pt tolerated fairly well. She was disturbed by the distorted sensation of the breast skin. [de-identified] : No breasts, s/p nipple sparing mastectomy [de-identified] : Left axilla with dermabond in place.

## 2023-08-15 NOTE — HISTORY OF PRESENT ILLNESS
[FreeTextEntry1] : Pt reports she is still feeling very torn about doing reconstruction and upset with how this whole process is disrupting her life, being unable to work out, missing work and having pain. She is also disturbed by the "ugly, lumpy" appearance of the reconstructed breasts with the Bob in place.

## 2023-08-15 NOTE — ASSESSMENT
[FreeTextEntry1] : Stable and recovering well physically, but still struggling with her diagnosis and coping with the disruption caused by it and the associated physical changes and surgery. She feels that some of her anxiety will be relieved after she gets the results of the SLNBx. Weekly f/u to fill Bob. Second stage to wait at least 3 months after the mastectomy.  The weight of the mastectomy specimens was 113 grams on the right and 126 grams on the left. The tissue expanders are 275 cc expanders with 40 cc fill in the OR. The TE was filled with 25 cc on 8/15/23 for a total volume of 65 cc.

## 2023-08-17 ENCOUNTER — NON-APPOINTMENT (OUTPATIENT)
Age: 43
End: 2023-08-17

## 2023-08-21 ENCOUNTER — APPOINTMENT (OUTPATIENT)
Dept: PLASTIC SURGERY | Facility: CLINIC | Age: 43
End: 2023-08-21
Payer: MEDICAID

## 2023-08-21 VITALS
RESPIRATION RATE: 16 BRPM | TEMPERATURE: 98.3 F | OXYGEN SATURATION: 99 % | HEART RATE: 78 BPM | BODY MASS INDEX: 20.43 KG/M2 | HEIGHT: 62 IN | WEIGHT: 111 LBS | SYSTOLIC BLOOD PRESSURE: 111 MMHG | DIASTOLIC BLOOD PRESSURE: 70 MMHG

## 2023-08-21 PROCEDURE — 99024 POSTOP FOLLOW-UP VISIT: CPT

## 2023-08-21 NOTE — ASSESSMENT
[FreeTextEntry1] : 44 y/o female for follow up and filling of TE  Patient is healing well and tolerated the filling of TE bilaterally  Patient reports she is to see Dr Mccarthy tomorrow 8/22/2023 Weekly f/u to fill Bob Second stage to be scheduled at least 3 months s/p mastectomy  The weight of the mastectomy specimens was 113 grams on the right and 126 grams on the left. The tissue expanders are 275 cc expanders with 40 cc fill in the OR. The TE was filled with 25 cc on 8/15/23 for a total volume of 65 cc.The TE was filled with 25 cc on 8/21/2023 bilaterally for a total of 90cc.

## 2023-08-21 NOTE — HISTORY OF PRESENT ILLNESS
[FreeTextEntry1] : "I am still uncomfortable" Patient presents for follow up for filling of tissue expanders.  She states that she still does not like how they feel but seems to be in better spirits.  No complaints.

## 2023-08-21 NOTE — REASON FOR VISIT
[Follow-Up: _____] : a [unfilled] follow-up visit [FreeTextEntry1] : Patient states her breasts are just there no issues to report.

## 2023-08-21 NOTE — PHYSICAL EXAM
[de-identified] : Bilateral tissue expanders 25 cc additional added to each TE without complication Steristrips removed. incisions well healed. [de-identified] : s/p nipple sparing bilateral mastectomies

## 2023-08-22 ENCOUNTER — APPOINTMENT (OUTPATIENT)
Dept: BREAST CENTER | Facility: CLINIC | Age: 43
End: 2023-08-22
Payer: MEDICAID

## 2023-08-22 VITALS
HEIGHT: 62 IN | HEART RATE: 66 BPM | BODY MASS INDEX: 20.43 KG/M2 | DIASTOLIC BLOOD PRESSURE: 49 MMHG | SYSTOLIC BLOOD PRESSURE: 96 MMHG | TEMPERATURE: 98.1 F | WEIGHT: 111 LBS | OXYGEN SATURATION: 100 %

## 2023-08-22 PROCEDURE — 99024 POSTOP FOLLOW-UP VISIT: CPT

## 2023-08-22 NOTE — REASON FOR VISIT
[Follow-Up: _____] : a [unfilled] follow-up visit [FreeTextEntry1] : Patient states she has waves of feeling woozy and nausea for a little and some pain is still felt.

## 2023-08-22 NOTE — ASSESSMENT
[FreeTextEntry1] : Stable postop. Still having trouble adjusting to the diagnosis and post-surgical changes. Will continue to support her through her journey. Follow up weekly. Follow up with Dr. Mccarthy next week.

## 2023-08-22 NOTE — HISTORY OF PRESENT ILLNESS
[FreeTextEntry1] : Referred by Dr. Francisco Rodriguez.  43-year-old, myriad negative, female here today for post op visit s/p 8/10/23 Left SLN Bx after 7/21/23 R prophylactic and left therapeutic mastectomy for right DCIS showed microinvasion.  8/10/23 Surgical path: Left sentinel LN. Reactive LN with numerous siderophages (H T E and iron stain). Negative for microscopic metastases with an AE1/AE3 cytokeratin stain. 7/21/23 Surgical path: Right breast SA, negative, mastectomy: papilloma, Left SA is negative. Left mastectomy, 2.1 cm DCIS, IDC .5 mm noted, all margins negative, DCIS closest is 2 mm. ER/DE on core bx was negative/negative. Receptors on microinvasion: ER- 0%, DE- 0%, Her2 positive.  Doing well post op, no hematoma, no cellulitis.   3/11/21 SB ELIH, Micheal dMMG: cluster of microcalc, innumerable masslike foci in both breasts, largest in R 12:00 augustin to area of clinical cancer, heterogeneously dense, rec 6 mos R U/S, BR3  3/11/21 SB ELIH, Micheal U/S: R approx 10:00 cyst with debris at area of interest just beneath nipple 5.6x4.5x2.1cm, 12:00 8mm cyst roughly same location, another 8mm focus which might be a smooth solid mass or complex cyst, 1:00 1.2cm cyst 3N, 5:00 2.2cm cyst 2N, L 1:00 1N 1.8cm cyst, 2:00 1N 5mm hypoechoic nodule, 6:00 couple of cysts 1N 1.9cm and 2.2cm, rec 6 mos. R U/S, BR3  10/15/21 NFR, R US: There is a 1.7 x 1.1 cm lobulated cyst at 12:00 o'clock, 1cmfn which is increased from 3/11/2021 report. There is an 8 x 6 x 9 mm cyst at 10:00 o'clock, 1 cmfn which is decreased from 3/11/2021 report. There is an 8 x 6 mm cyst with debris at 11:00 o'clock, 1cmfn There is a 2.2 x 0.8 x 2.8 cm cyst at 6:00 o'clock, 1cmfn. Rec US in 6 mos. BR3  5/26/23 NFR, bilat sMMG and US: TC 21.6%. Extremely dense. Palpable lump corresponds to an ill-defined density associated with pleomorphic calcs. There are bilateral well-circumscribed masses in this patient with known cysts. US: R- No suspicious solid mass. Numerous cysts are identified throughout the right breast with the largest measuring up to 4cm. L- In the 5:00 left breast is a 17mm hypoechoic mass with internal calcs corresponding to the mmg and palpable findings. Numerous cysts are identified throughout the left breast. Rec bx of palpable mass 5:00 L breast. BR4B  6/2/23 Stanley, US guided bx L breast 5:00: DCIS G2-3, ER negative 0%, DE negative 0% papillary, solid cribriform pattern with lobular extension, comedo necrosis and calcs. Some area are suggestive of associated papillary lesions. Immunostains for P63 and smooth muscle myosin heavy chain highlight the myoepithelial cells, support the in situ nature of the lesion. Concordant. Rec surgical or oncological management.  6/23/23 [place], MRI: R- far posterior UOQ R breast is an enhancing focus measuring 8mm. While this may represent artifactual background enhancement given the focal appearance targeted US is rec. In the absence of finding on US this is felt to be a benign finding. No addl site of susp enhancement R breast. L- Signal void from tissue marker placed at bx is seen on axial image 129. The marker is within an enhancing mass measuring 2.3cm (craniocaudad dimension) and 2cm (mediolateral dimension). This represents the bx proven malignancy. This is located in the lateral aspect approx 5:00. No addl sites of susp enhancement in the L breast. Axilla- no significant axillary or internal mammary lymphadenopathy. Impression: Focal enhancement far posterior UOQ of R breast may represent artifact. Targeted US is rec. In the absence of US findings this would be felt to be benign. Bx proven malignancy left breast without evidence of multifocal or multicentric dx. Rec addl imaging. BR0  7/7/23 NFR, R US: No susp solid mass. Scattered cysts are seen. No mass to correlate with MRI finding. MRI findings are felt to be artifactual. Rec surgical or oncological management. BR2  Fhx: breast ca: mAunt x2-50's (one of them also had blood cancer), Mcousin 30's (also had lung cancer), Ovarian cancer MGM 84, Lung, MGF 80, MGM ?age, cervical Mother 51 y/o, Muncle ? age. Pt has one daughter and one son.

## 2023-08-22 NOTE — DATA REVIEWED
[FreeTextEntry1] : 8/10/23 Surgical path: Left sentinel LN. Reactive LN with numerous siderophages (H T E and iron stain). Negative for microscopic metastases with an AE1/AE3 cytokeratin stain.

## 2023-08-22 NOTE — ASSESSMENT
[FreeTextEntry1] : Referred by Dr. Francisco Rodriguez.  43-year-old, myriad negative, female here today for post op visit s/p 8/10/23 Left SLN Bx after 7/21/23 R prophylactic and left therapeutic mastectomy for right DCIS showed microinvasion.  8/10/23 Surgical path: Left sentinel LN. Reactive LN with numerous siderophages (H T E and iron stain). Negative for microscopic metastases with an AE1/AE3 cytokeratin stain. 7/21/23 Surgical path: Right breast SA, negative, mastectomy: papilloma, Left SA is negative. Left mastectomy, 2.1 cm DCIS, IDC .5 mm noted, all margins negative, DCIS closest is 2 mm. ER/TX on core bx was negative/negative. Receptors on microinvasion: ER- 0%, TX- 0%, Her2 positive.  Doing well post op, no hematoma, no cellulitis.   3/11/21 SB ELIH, Micheal dMMG: cluster of microcalc, innumerable masslike foci in both breasts, largest in R 12:00 augustin to area of clinical cancer, heterogeneously dense, rec 6 mos R U/S, BR3  3/11/21 SB ELIH, Micheal U/S: R approx 10:00 cyst with debris at area of interest just beneath nipple 5.6x4.5x2.1cm, 12:00 8mm cyst roughly same location, another 8mm focus which might be a smooth solid mass or complex cyst, 1:00 1.2cm cyst 3N, 5:00 2.2cm cyst 2N, L 1:00 1N 1.8cm cyst, 2:00 1N 5mm hypoechoic nodule, 6:00 couple of cysts 1N 1.9cm and 2.2cm, rec 6 mos. R U/S, BR3  10/15/21 NFR, R US: There is a 1.7 x 1.1 cm lobulated cyst at 12:00 o'clock, 1cmfn which is increased from 3/11/2021 report. There is an 8 x 6 x 9 mm cyst at 10:00 o'clock, 1 cmfn which is decreased from 3/11/2021 report. There is an 8 x 6 mm cyst with debris at 11:00 o'clock, 1cmfn There is a 2.2 x 0.8 x 2.8 cm cyst at 6:00 o'clock, 1cmfn. Rec US in 6 mos. BR3  5/26/23 NFR, bilat sMMG and US: TC 21.6%. Extremely dense. Palpable lump corresponds to an ill-defined density associated with pleomorphic calcs. There are bilateral well-circumscribed masses in this patient with known cysts. US: R- No suspicious solid mass. Numerous cysts are identified throughout the right breast with the largest measuring up to 4cm. L- In the 5:00 left breast is a 17mm hypoechoic mass with internal calcs corresponding to the mmg and palpable findings. Numerous cysts are identified throughout the left breast. Rec bx of palpable mass 5:00 L breast. BR4B  6/2/23 [facility], US guided bx L breast 5:00: DCIS G2-3, ER negative 0%, TX negative 0% papillary, solid cribriform pattern with lobular extension, comedo necrosis and calcs. Some area are suggestive of associated papillary lesions. Immunostains for P63 and smooth muscle myosin heavy chain highlight the myoepithelial cells, support the in situ nature of the lesion. Concordant. Rec surgical or oncological management.  6/23/23 [place], MRI: R- far posterior UOQ R breast is an enhancing focus measuring 8mm. While this may represent artifactual background enhancement given the focal appearance targeted US is rec. In the absence of finding on US this is felt to be a benign finding. No addl site of susp enhancement R breast. L- Signal void from tissue marker placed at bx is seen on axial image 129. The marker is within an enhancing mass measuring 2.3cm (craniocaudad dimension) and 2cm (mediolateral dimension). This represents the bx proven malignancy. This is located in the lateral aspect approx 5:00. No addl sites of susp enhancement in the L breast. Axilla- no significant axillary or internal mammary lymphadenopathy. Impression: Focal enhancement far posterior UOQ of R breast may represent artifact. Targeted US is rec. In the absence of US findings this would be felt to be benign. Bx proven malignancy left breast without evidence of multifocal or multicentric dx. Rec addl imaging. BR0  7/7/23 NFR, R US: No susp solid mass. Scattered cysts are seen. No mass to correlate with MRI finding. MRI findings are felt to be artifactual. Rec surgical or oncological management. BR2  Fhx: breast ca: mAunt x2-50's (one of them also had blood cancer), Mcousin 30's (also had lung cancer), Ovarian cancer MGM 86, Lung, MGF 80, MGM ?age, cervical Mother 51 y/o, Muncle ? age. Pt has one daughter and one son.  CBE: Bilat mastectomy, Right PA discoloration from magtrace>Left side. Left axillary inc is intact and healing.  ROM is not full, rec ok from Dr. Dunn to resume normal activities. ALso referred to Dr. Josue for ROM exercises and exercise oncology. Reviewed with pt SLN was negative, N0/1, final is [id number]  Plan: To stop in [place] office for post op [place] Appt with Dr. Josue for ROM exercises F/u with Dr. Dunn.

## 2023-08-22 NOTE — PHYSICAL EXAM
[de-identified] : Anxious, upset [de-identified] : NL respiratory effort noted  [de-identified] : Bilateral Bob in good position. Bilateral nipples viable. Right MagTrace staining tender in right LOQ over drain. Drains sersoanguinous. [de-identified] : No breasts, s/p bilateral mastectomy.

## 2023-08-22 NOTE — PHYSICAL EXAM
[No Edema] : no edema [No Swelling] : no swelling [de-identified] : bilat mastectomy, PA discoloration from magtrace, left axillary inc intact.  [de-identified] : ROM bilat is not full

## 2023-08-28 ENCOUNTER — APPOINTMENT (OUTPATIENT)
Dept: PLASTIC SURGERY | Facility: CLINIC | Age: 43
End: 2023-08-28
Payer: MEDICAID

## 2023-08-28 VITALS
HEIGHT: 62 IN | SYSTOLIC BLOOD PRESSURE: 120 MMHG | RESPIRATION RATE: 16 BRPM | WEIGHT: 111 LBS | TEMPERATURE: 98 F | OXYGEN SATURATION: 99 % | BODY MASS INDEX: 20.43 KG/M2 | HEART RATE: 67 BPM | DIASTOLIC BLOOD PRESSURE: 70 MMHG

## 2023-08-28 PROCEDURE — 99024 POSTOP FOLLOW-UP VISIT: CPT

## 2023-08-28 NOTE — REASON FOR VISIT
[Follow-Up: _____] : a [unfilled] follow-up visit [FreeTextEntry1] : Patient states she feels good when she doesn't think about the situation.

## 2023-08-28 NOTE — ASSESSMENT
[FreeTextEntry1] : Doing a little better each week. Will skip the fill this week and go to every other week, as pt does not want to be much larger. Follow up in one week and three weeks.  The weight of the mastectomy specimens was 113 grams on the right and 126 grams on the left. The tissue expanders are 275 cc expanders with 40 cc fill in the OR. The TE was filled with 25 cc on 8/15/23 for a total volume of 65 cc. The TE was filled with 25 cc on 8/21/2023 bilaterally for a total of 90cc. No fill on 8/28.

## 2023-08-28 NOTE — PHYSICAL EXAM
[de-identified] : Less anxious today. [de-identified] : NL respiratory effort noted  [de-identified] : Bilateral Bob in good position. Bilateral nipples viable. Right MagTrace staining No erythema. No collection palpated. [de-identified] : No breasts, s/p bilateral mastectomy.

## 2023-08-28 NOTE — HISTORY OF PRESENT ILLNESS
[FreeTextEntry1] : Pt reports she is getting back to normal activities. She was able to drop her daughter off at college and do a lot of cleaning and painting around the house. She does report feeling like something ripped inside on the right when her friend hugged her. She also notes the right sided pain is less since then.

## 2023-09-07 ENCOUNTER — APPOINTMENT (OUTPATIENT)
Dept: PLASTIC SURGERY | Facility: CLINIC | Age: 43
End: 2023-09-07
Payer: MEDICAID

## 2023-09-07 VITALS
RESPIRATION RATE: 16 BRPM | HEIGHT: 62 IN | DIASTOLIC BLOOD PRESSURE: 80 MMHG | BODY MASS INDEX: 20.24 KG/M2 | HEART RATE: 68 BPM | TEMPERATURE: 98 F | OXYGEN SATURATION: 100 % | SYSTOLIC BLOOD PRESSURE: 118 MMHG | WEIGHT: 110 LBS

## 2023-09-07 PROCEDURE — 99024 POSTOP FOLLOW-UP VISIT: CPT

## 2023-09-07 NOTE — HISTORY OF PRESENT ILLNESS
[FreeTextEntry1] : "I feel great" Patient presents for follow up and states that she has some discomfort over the tissue expanders and that they feel sensitive.  She is happy with the way that they look aside from the rigid edges and is not certain if she desires them to be filled anymore.  She has no other complaints today.

## 2023-09-07 NOTE — PHYSICAL EXAM
[NI] : Normal [de-identified] : A& O X 3 in NAD appears in good spirits [de-identified] : Bilateral tissue expanders in place, right recon with some skin discoloration from the mag trace +noted around NAC, mild tenderness noted on palpation , incisions are healed bilaterally  [de-identified] : s/p bilateral nipple sparing mastectomies

## 2023-09-07 NOTE — ASSESSMENT
[FreeTextEntry1] : 42 y/o female for follow up with tissue expanders in place   Patient is satisfied with her size of her tissue expanders  Follow up in 1 month to discuss and plan for second stage  all questions and concerns addressed  plan and patient status as per Dr Dunn

## 2023-09-07 NOTE — REASON FOR VISIT
[Follow-Up: _____] : a [unfilled] follow-up visit [FreeTextEntry1] : Patient states she is feeling great no issues to report.

## 2023-09-18 ENCOUNTER — APPOINTMENT (OUTPATIENT)
Dept: PLASTIC SURGERY | Facility: CLINIC | Age: 43
End: 2023-09-18
Payer: MEDICAID

## 2023-09-18 VITALS
OXYGEN SATURATION: 100 % | SYSTOLIC BLOOD PRESSURE: 111 MMHG | HEIGHT: 62 IN | WEIGHT: 115 LBS | HEART RATE: 66 BPM | BODY MASS INDEX: 21.16 KG/M2 | DIASTOLIC BLOOD PRESSURE: 68 MMHG | TEMPERATURE: 98 F

## 2023-09-18 PROCEDURE — 99024 POSTOP FOLLOW-UP VISIT: CPT

## 2023-09-26 ENCOUNTER — APPOINTMENT (OUTPATIENT)
Dept: BREAST CENTER | Facility: CLINIC | Age: 43
End: 2023-09-26
Payer: MEDICAID

## 2023-09-26 VITALS
WEIGHT: 112 LBS | SYSTOLIC BLOOD PRESSURE: 99 MMHG | TEMPERATURE: 98.2 F | HEART RATE: 78 BPM | DIASTOLIC BLOOD PRESSURE: 62 MMHG | HEIGHT: 62 IN | BODY MASS INDEX: 20.61 KG/M2

## 2023-09-26 PROCEDURE — 99024 POSTOP FOLLOW-UP VISIT: CPT

## 2023-09-26 RX ORDER — METOPROLOL SUCCINATE 25 MG/1
25 TABLET, EXTENDED RELEASE ORAL
Qty: 30 | Refills: 0 | Status: COMPLETED | COMMUNITY
Start: 2023-04-23 | End: 2023-09-26

## 2023-09-26 RX ORDER — METOPROLOL TARTRATE 75 MG/1
TABLET, FILM COATED ORAL
Refills: 0 | Status: COMPLETED | COMMUNITY
End: 2023-09-26

## 2023-10-03 ENCOUNTER — NON-APPOINTMENT (OUTPATIENT)
Age: 43
End: 2023-10-03

## 2023-10-11 ENCOUNTER — APPOINTMENT (OUTPATIENT)
Dept: PLASTIC SURGERY | Facility: CLINIC | Age: 43
End: 2023-10-11
Payer: MEDICAID

## 2023-10-11 VITALS
OXYGEN SATURATION: 100 % | WEIGHT: 109 LBS | HEIGHT: 62 IN | TEMPERATURE: 98.3 F | SYSTOLIC BLOOD PRESSURE: 110 MMHG | RESPIRATION RATE: 16 BRPM | DIASTOLIC BLOOD PRESSURE: 64 MMHG | HEART RATE: 84 BPM | BODY MASS INDEX: 20.06 KG/M2

## 2023-10-11 PROCEDURE — 99024 POSTOP FOLLOW-UP VISIT: CPT

## 2023-12-07 ENCOUNTER — APPOINTMENT (OUTPATIENT)
Dept: PLASTIC SURGERY | Facility: CLINIC | Age: 43
End: 2023-12-07
Payer: MEDICAID

## 2023-12-07 VITALS
SYSTOLIC BLOOD PRESSURE: 122 MMHG | HEIGHT: 62 IN | DIASTOLIC BLOOD PRESSURE: 72 MMHG | WEIGHT: 117 LBS | OXYGEN SATURATION: 100 % | TEMPERATURE: 98.7 F | RESPIRATION RATE: 16 BRPM | HEART RATE: 68 BPM | BODY MASS INDEX: 21.53 KG/M2

## 2023-12-07 PROCEDURE — 99214 OFFICE O/P EST MOD 30 MIN: CPT

## 2023-12-12 ENCOUNTER — APPOINTMENT (OUTPATIENT)
Dept: BREAST CENTER | Facility: CLINIC | Age: 43
End: 2023-12-12
Payer: MEDICAID

## 2023-12-12 VITALS
DIASTOLIC BLOOD PRESSURE: 66 MMHG | SYSTOLIC BLOOD PRESSURE: 97 MMHG | HEART RATE: 66 BPM | HEIGHT: 62 IN | BODY MASS INDEX: 21.16 KG/M2 | TEMPERATURE: 98.1 F | WEIGHT: 115 LBS

## 2023-12-12 DIAGNOSIS — K21.9 GASTRO-ESOPHAGEAL REFLUX DISEASE W/OUT ESOPHAGITIS: ICD-10-CM

## 2023-12-12 PROCEDURE — 99214 OFFICE O/P EST MOD 30 MIN: CPT

## 2023-12-12 RX ORDER — METOPROLOL SUCCINATE 25 MG/1
25 TABLET, EXTENDED RELEASE ORAL DAILY
Refills: 0 | Status: ACTIVE | COMMUNITY

## 2023-12-14 NOTE — PHYSICAL EXAM
[de-identified] : A& O X 3 in NAD appears in good spirits [de-identified] : Bilateral tissue expanders in place, right recon with some skin discoloration from the mag trace +noted around NAC, mild tenderness noted on palpation , incisions are healed bilaterally.  Her nipples are reactive, but no sensation at this time. [de-identified] : s/p bilateral nipple sparing mastectomies

## 2023-12-14 NOTE — HISTORY OF PRESENT ILLNESS
[FreeTextEntry1] : "I'm just uncomfortable" Patient presents for follow up and states that she does not like the way the TE feel.  She states that occasionally she feels sore above the left TE as well as under that armpit.  She has been more physically active and gained 8 lbs since her last surgery.  She states she is ready for the TE to be removed and implants to be placed.  She has no other complaints today.

## 2023-12-14 NOTE — ASSESSMENT
[FreeTextEntry1] : 42 y/o female for follow up   Patient occasionally feels uncomfortable, but is feeling better today All questions and concerns addressed  Patient is to see Dr Mccarthy on 12/12/23 Patient is scheduled for her second stage procedure on 1/19/2024 Follow up as needed Plan and patient status as per Dr Cifuentes

## 2023-12-14 NOTE — ADDENDUM
[FreeTextEntry1] :  All medical record entries were made at my direction (Dr. Charla Dunn). I have reviewed the chart and agree that the record accurately reflects the history, physical exam, assessment and plan.

## 2023-12-14 NOTE — REASON FOR VISIT
[FreeTextEntry1] : Patient last seen 10/11/23 c/o of sore tissue from expanders. Pt states she is just here for a check up when asked what was the reason for visit.

## 2024-01-19 ENCOUNTER — RESULT REVIEW (OUTPATIENT)
Age: 44
End: 2024-01-19

## 2024-01-19 ENCOUNTER — APPOINTMENT (OUTPATIENT)
Dept: PLASTIC SURGERY | Facility: HOSPITAL | Age: 44
End: 2024-01-19

## 2024-01-20 ENCOUNTER — NON-APPOINTMENT (OUTPATIENT)
Age: 44
End: 2024-01-20

## 2024-01-24 ENCOUNTER — APPOINTMENT (OUTPATIENT)
Dept: PLASTIC SURGERY | Facility: CLINIC | Age: 44
End: 2024-01-24
Payer: MEDICAID

## 2024-01-24 VITALS
HEART RATE: 84 BPM | OXYGEN SATURATION: 98 % | DIASTOLIC BLOOD PRESSURE: 68 MMHG | BODY MASS INDEX: 21.16 KG/M2 | HEIGHT: 62 IN | SYSTOLIC BLOOD PRESSURE: 100 MMHG | TEMPERATURE: 98.7 F | WEIGHT: 115 LBS

## 2024-01-24 PROCEDURE — 99024 POSTOP FOLLOW-UP VISIT: CPT

## 2024-01-24 NOTE — HISTORY OF PRESENT ILLNESS
[FreeTextEntry1] : "I feel sore on my legs"  Patient presents for follow up and states that she is feeling much better after her surgery.  She states that the difference from the tissue expanders to the implants is so nice.  She no longer has any discomfort when she sleeps, moves, or with the seatbelt.  She states that her legs felt as though she performed 100 squats, but she did not take any narcotics for the pain.  She states that she is very happy with the size of her implants as well.

## 2024-01-24 NOTE — ASSESSMENT
[FreeTextEntry1] : 44 y/o female for follow up  Healing well Plan is to follow up in two weeks Advise not to return to work unless light duty, no heavy lifting, pushing, or pulling All questions and concerns addressed. Apply vaseline to incisions, also at fat harvest scars. Plan and patient status as per Dr Dunn

## 2024-01-24 NOTE — PHYSICAL EXAM
[de-identified] : Bilateral chest with incisions C/D/I, steri strips in place. Bilateral ecchymosis noted on each chest especially in the area of fat grafting, mild tenderness bilaterally. Normal postoperative edema noted bilaterally. No evidence of infection bilaterally.   [de-identified] : no breast, s/p nipple sparing mastectomies. [de-identified] : Bilateral thighs with ecchymosis noted from fat harvesting areas, small incisions with sutures in place,  mild diffuse tenderness noted bilaterally with some numbness above the right knee.

## 2024-01-31 ENCOUNTER — OUTPATIENT (OUTPATIENT)
Dept: OUTPATIENT SERVICES | Facility: HOSPITAL | Age: 44
LOS: 1 days | End: 2024-01-31
Payer: MEDICAID

## 2024-01-31 DIAGNOSIS — D64.9 ANEMIA, UNSPECIFIED: ICD-10-CM

## 2024-02-01 ENCOUNTER — APPOINTMENT (OUTPATIENT)
Dept: HEMATOLOGY ONCOLOGY | Facility: CLINIC | Age: 44
End: 2024-02-01
Payer: MEDICAID

## 2024-02-01 ENCOUNTER — NON-APPOINTMENT (OUTPATIENT)
Age: 44
End: 2024-02-01

## 2024-02-01 ENCOUNTER — LABORATORY RESULT (OUTPATIENT)
Age: 44
End: 2024-02-01

## 2024-02-01 VITALS
DIASTOLIC BLOOD PRESSURE: 66 MMHG | OXYGEN SATURATION: 100 % | SYSTOLIC BLOOD PRESSURE: 104 MMHG | WEIGHT: 114 LBS | HEART RATE: 57 BPM | BODY MASS INDEX: 20.98 KG/M2 | TEMPERATURE: 98 F | HEIGHT: 62 IN

## 2024-02-01 DIAGNOSIS — Z17.1 MALIGNANT NEOPLASM OF LOWER-OUTER QUADRANT OF LEFT FEMALE BREAST: ICD-10-CM

## 2024-02-01 DIAGNOSIS — C50.512 MALIGNANT NEOPLASM OF LOWER-OUTER QUADRANT OF LEFT FEMALE BREAST: ICD-10-CM

## 2024-02-01 LAB
HCT VFR BLD CALC: 32.3 %
HGB BLD-MCNC: 10 G/DL
MCHC RBC-ENTMCNC: 25.7 PG
MCHC RBC-ENTMCNC: 31 GM/DL
MCV RBC AUTO: 83 FL
PLATELET # BLD AUTO: 332 K/UL
RBC # BLD: 3.89 M/UL
RBC # FLD: 17.3 %
WBC # FLD AUTO: 8.94 K/UL

## 2024-02-01 PROCEDURE — 99204 OFFICE O/P NEW MOD 45 MIN: CPT

## 2024-02-01 RX ORDER — FAMOTIDINE 20 MG/1
20 TABLET, FILM COATED ORAL
Refills: 0 | Status: DISCONTINUED | COMMUNITY
End: 2024-02-01

## 2024-02-01 RX ORDER — OXYCODONE HYDROCHLORIDE 5 MG/1
5 CAPSULE ORAL
Qty: 5 | Refills: 0 | Status: DISCONTINUED | COMMUNITY
Start: 2023-07-20 | End: 2024-02-01

## 2024-02-01 RX ORDER — CEPHALEXIN 500 MG/1
500 CAPSULE ORAL 3 TIMES DAILY
Qty: 21 | Refills: 1 | Status: DISCONTINUED | COMMUNITY
Start: 2023-07-22 | End: 2024-02-01

## 2024-02-01 NOTE — HISTORY OF PRESENT ILLNESS
[de-identified] : Referred by: PCP PCP: Dr. Michael BURGER JEANNA presented at age 43 year on 2024 for evaluation of anemia. She was noted to have a low HGB in 2023. She is reporting heavy menses and recently had a uterine polyp removed by Gyn. Additionally, she reports FUCHS, palpitations, and fatigue. She had colonoscopy and endoscopy with Dr. Olmedo which she states was (-). Denies any dark stools, hematuria or blood per rectum. 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 She is s/p bilateral mastectomy with reconstruction. Laboratory studies from 1/10/24 reviewed and notable for: HGB= 10.6, HCT= 33.4, WBC= 7.42, Fqj=439. Additionally, labs from 23 show a low ferritin of 7, TIBC= 444, iron%= 4%, s. iron= 16%, B12- 527.    HCM: - COVID vaccination: - Colonoscopy: Dr. Olmedo- endo/colo 2024 - Gyn: Dr. Olsen - Mammo: Spring 2023 - Lung cancer screen: No   SH: - Occupation:  at [employer] - Living situation: Lives in home with children  - Smoking/Etoh/illicit drugs: Social ETOH, quit smoking > 20 years 1ppd x 8 years, quit vaping - Exercise: Regularly, walking, gym- difficult last few months   FH: Maternal Grandmother-  age 86, Ovarian Cancer Maternal Grandfather-  age 90, esophageal cancer Paternal Grandmother-  lung cancer Paternal Grandfather- , unknown Mother-  age 52, cervical cancer Father-  age 63, Lung cancer Maternal Aunts x2-50's Dx breast Cancer- (one of them also had blood cancer and had uterine cancer)  Maternal cousin- dx in her 30's with breast cancer, now with brain cancer age 50's 1 son- age 15, healthy 1 daughter- age 18, healthy

## 2024-02-01 NOTE — CONSULT LETTER
[Dear  ___] : Dear  [unfilled], [Consult Letter:] : I had the pleasure of evaluating your patient, [unfilled]. [Please see my note below.] : Please see my note below. [Consult Closing:] : Thank you very much for allowing me to participate in the care of this patient.  If you have any questions, please do not hesitate to contact me. [Sincerely,] : Sincerely, [FreeTextEntry3] : Shikha Galarza, KOFI, ANP-c

## 2024-02-01 NOTE — RESULTS/DATA
[FreeTextEntry1] : JENNYFER MEEKS  is a 43 year old female who presents for initial evaluation of iron deficiency anemia, likely form heavy menses.

## 2024-02-01 NOTE — REVIEW OF SYSTEMS
[Fever] : no fever [Chills] : no chills [Night Sweats] : no night sweats [Fatigue] : fatigue [Vision Problems] : no vision problems [Loss of Hearing] : no loss of hearing [Nosebleeds] : no nosebleeds [Chest Pain] : no chest pain [Palpitations] : palpitations [Lower Ext Edema] : no lower extremity edema [Shortness Of Breath] : no shortness of breath [Wheezing] : wheezing [Cough] : no cough [SOB on Exertion] : shortness of breath during exertion [Abdominal Pain] : no abdominal pain [Vomiting] : no vomiting [Constipation] : no constipation [Diarrhea: Grade 0] : Diarrhea: Grade 0 [Dysmenorrhea/Abn Vaginal Bleeding] : dysmenorrhea/abnormal vaginal bleeding [Joint Pain] : joint pain [Joint Stiffness] : joint stiffness [Muscle Pain] : muscle pain [Skin Rash] : no skin rash [Skin Wound] : no skin wound [Confused] : no confusion [Dizziness] : dizziness [Fainting] : no fainting [Difficulty Walking] : no difficulty walking [Insomnia] : no insomnia [Anxiety] : anxiety [Depression] : no depression [Easy Bleeding] : no tendency for easy bleeding [Easy Bruising] : no tendency for easy bruising [Swollen Glands] : no swollen glands [FreeTextEntry8] : Very heavy, every 28 days, recent uterine polypectomy 12/23 [FreeTextEntry9] : H/O Lyme disease, back spasms

## 2024-02-01 NOTE — HISTORY OF PRESENT ILLNESS
[de-identified] : Referred by: PCP PCP: Dr. Michael BURGER JEANNA presented at age 43 year on 2024 for evaluation of anemia. She was noted to have a low HGB in 2023. She is reporting heavy menses and recently had a uterine polyp removed by Gyn. Additionally, she reports FUCHS, palpitations, and fatigue. She had colonoscopy and endoscopy with Dr. Olmedo which she states was (-). Denies any dark stools, hematuria or blood per rectum. 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 She is s/p bilateral mastectomy with reconstruction. Laboratory studies from 1/10/24 reviewed and notable for: HGB= 10.6, HCT= 33.4, WBC= 7.42, Zur=168. Additionally, labs from 23 show a low ferritin of 7, TIBC= 444, iron%= 4%, s. iron= 16%, B12- 527.    HCM: - COVID vaccination: - Colonoscopy: Dr. Olmedo- endo/colo 2024 - Gyn: Dr. Olsen - Mammo: Spring 2023 - Lung cancer screen: No   SH: - Occupation:  at [employer] - Living situation: Lives in home with children  - Smoking/Etoh/illicit drugs: Social ETOH, quit smoking > 20 years 1ppd x 8 years, quit vaping - Exercise: Regularly, walking, gym- difficult last few months   FH: Maternal Grandmother-  age 86, Ovarian Cancer Maternal Grandfather-  age 90, esophageal cancer Paternal Grandmother-  lung cancer Paternal Grandfather- , unknown Mother-  age 52, cervical cancer Father-  age 63, Lung cancer Maternal Aunts x2-50's Dx breast Cancer- (one of them also had blood cancer and had uterine cancer)  Maternal cousin- dx in her 30's with breast cancer, now with brain cancer age 50's 1 son- age 15, healthy 1 daughter- age 18, healthy

## 2024-02-01 NOTE — REVIEW OF SYSTEMS
[Fatigue] : fatigue [Palpitations] : palpitations [Wheezing] : wheezing [SOB on Exertion] : shortness of breath during exertion [Diarrhea: Grade 0] : Diarrhea: Grade 0 [Dysmenorrhea/Abn Vaginal Bleeding] : dysmenorrhea/abnormal vaginal bleeding [Joint Pain] : joint pain [Joint Stiffness] : joint stiffness [Muscle Pain] : muscle pain [Dizziness] : dizziness [Anxiety] : anxiety [Fever] : no fever [Chills] : no chills [Night Sweats] : no night sweats [Vision Problems] : no vision problems [Loss of Hearing] : no loss of hearing [Nosebleeds] : no nosebleeds [Chest Pain] : no chest pain [Lower Ext Edema] : no lower extremity edema [Shortness Of Breath] : no shortness of breath [Cough] : no cough [Abdominal Pain] : no abdominal pain [Vomiting] : no vomiting [Constipation] : no constipation [Skin Rash] : no skin rash [Skin Wound] : no skin wound [Confused] : no confusion [Fainting] : no fainting [Difficulty Walking] : no difficulty walking [Insomnia] : no insomnia [Depression] : no depression [Easy Bleeding] : no tendency for easy bleeding [Easy Bruising] : no tendency for easy bruising [Swollen Glands] : no swollen glands [FreeTextEntry8] : Very heavy, every 28 days, recent uterine polypectomy 12/23 [FreeTextEntry9] : H/O Lyme disease, back spasms

## 2024-02-02 LAB
ALBUMIN SERPL ELPH-MCNC: 5 G/DL
ALP BLD-CCNC: 51 U/L
ALT SERPL-CCNC: 14 U/L
ANION GAP SERPL CALC-SCNC: 14 MMOL/L
AST SERPL-CCNC: 16 U/L
BILIRUB SERPL-MCNC: 0.3 MG/DL
BUN SERPL-MCNC: 18 MG/DL
CALCIUM SERPL-MCNC: 9.6 MG/DL
CHLORIDE SERPL-SCNC: 106 MMOL/L
CO2 SERPL-SCNC: 22 MMOL/L
CREAT SERPL-MCNC: 0.69 MG/DL
CRP SERPL-MCNC: <3 MG/L
EGFR: 109 ML/MIN/1.73M2
ERYTHROCYTE [SEDIMENTATION RATE] IN BLOOD BY WESTERGREN METHOD: 19 MM/HR
FERRITIN SERPL-MCNC: 17 NG/ML
FOLATE SERPL-MCNC: 18 NG/ML
GLUCOSE SERPL-MCNC: 89 MG/DL
IRON SATN MFR SERPL: 3 %
IRON SERPL-MCNC: 17 UG/DL
LDH SERPL-CCNC: 191 U/L
POTASSIUM SERPL-SCNC: 4.4 MMOL/L
PROT SERPL-MCNC: 7.5 G/DL
RHEUMATOID FACT SER QL: 11 IU/ML
SODIUM SERPL-SCNC: 141 MMOL/L
TIBC SERPL-MCNC: 511 UG/DL
TRANSFERRIN SERPL-MCNC: 406 MG/DL
UIBC SERPL-MCNC: 494 UG/DL
VIT B12 SERPL-MCNC: 509 PG/ML

## 2024-02-05 LAB — ANA SER IF-ACNC: NEGATIVE

## 2024-02-07 ENCOUNTER — APPOINTMENT (OUTPATIENT)
Dept: PLASTIC SURGERY | Facility: CLINIC | Age: 44
End: 2024-02-07
Payer: MEDICAID

## 2024-02-07 VITALS
TEMPERATURE: 98.1 F | SYSTOLIC BLOOD PRESSURE: 124 MMHG | HEART RATE: 81 BPM | BODY MASS INDEX: 20.98 KG/M2 | OXYGEN SATURATION: 99 % | WEIGHT: 114 LBS | HEIGHT: 62 IN | DIASTOLIC BLOOD PRESSURE: 82 MMHG

## 2024-02-07 PROCEDURE — 99024 POSTOP FOLLOW-UP VISIT: CPT

## 2024-02-07 NOTE — REASON FOR VISIT
[Post Op: _________] : a [unfilled] post op visit [FreeTextEntry1] : states there is a little ripple in her left breast. Also states still has little numbness in right leg since procedure.

## 2024-02-07 NOTE — PHYSICAL EXAM
[NI] : Normal [de-identified] : Bilateral chest with incisions C/D/I, steri strips in place, removed today. Mild tenderness of reconstructed breasts bilaterally. No evidence of infection bilaterally.  Mild rippling when leaning forward, left greater than right  [de-identified] : no breast, s/p nipple sparing mastectomies. [de-identified] : Bilateral thighs with small incision healing  Some continued numbness above the right knee. This is an area about 6 x 8 cm on the anterior distal thigh, just proximal to the knee.

## 2024-02-07 NOTE — HISTORY OF PRESENT ILLNESS
[FreeTextEntry1] : "I played twister last night" Patient presents for follow up and states that she continues to be sore in her chest bilaterally, and that she continues to have some numbness in her right thigh where the liposuction was performed.  She has noticed some rippling of the implants through the skin.  She has also felt fatigued from her low iron levels for which she is going to be receiving infusions.  She has no other complaints today.

## 2024-02-07 NOTE — ASSESSMENT
[FreeTextEntry1] : 42 y/o female for follow up   Healed well Plan is to follow up in 3 weeks  All questions and concerns addressed  Return to work as , but no heavy lifting as of yet. Plan and patient status as per Dr Dunn Iron def anemia: symptomatic, [facility], Dr. Gilbert

## 2024-02-08 ENCOUNTER — APPOINTMENT (OUTPATIENT)
Dept: INFUSION THERAPY | Facility: CANCER CENTER | Age: 44
End: 2024-02-08

## 2024-02-09 DIAGNOSIS — E61.1 IRON DEFICIENCY: ICD-10-CM

## 2024-02-15 ENCOUNTER — APPOINTMENT (OUTPATIENT)
Dept: INFUSION THERAPY | Facility: CANCER CENTER | Age: 44
End: 2024-02-15

## 2024-02-15 ENCOUNTER — APPOINTMENT (OUTPATIENT)
Dept: HEMATOLOGY ONCOLOGY | Facility: CLINIC | Age: 44
End: 2024-02-15
Payer: MEDICAID

## 2024-02-15 VITALS
SYSTOLIC BLOOD PRESSURE: 112 MMHG | TEMPERATURE: 97.3 F | DIASTOLIC BLOOD PRESSURE: 61 MMHG | RESPIRATION RATE: 16 BRPM | HEIGHT: 62 IN | OXYGEN SATURATION: 99 % | HEART RATE: 70 BPM

## 2024-02-15 PROCEDURE — 99213 OFFICE O/P EST LOW 20 MIN: CPT

## 2024-02-15 NOTE — RESULTS/DATA
[FreeTextEntry1] : JENNYFER MEEKS  is a 43 year old female who presented for initial evaluation of iron deficiency anemia, likely form heavy menses.

## 2024-02-15 NOTE — PHYSICAL EXAM
[Fully active, able to carry on all pre-disease performance without restriction] : Status 0 - Fully active, able to carry on all pre-disease performance without restriction [Normal] : affect appropriate [de-identified] : NAD, cooperative female. VSS (in [place])

## 2024-02-15 NOTE — HISTORY OF PRESENT ILLNESS
Awake [de-identified] : Referred by: PCP PCP: Dr. Michael BURGER JEANNA initially presented at age 43 year on 2024 for evaluation of anemia. She was noted to have a low HGB in 2023. She is reporting heavy menses and recently had a uterine polyp removed by Gyn. Additionally, she reported FUCHS, palpitations, and fatigue. She had colonoscopy and endoscopy with Dr. Olmedo which she states was (-). Denied any dark stools, hematuria or blood per rectum. 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 She is s/p bilateral mastectomy with reconstruction. Laboratory studies from 1/10/24 reviewed and notable for: HGB= 10.6, HCT= 33.4, WBC= 7.42, Vak=636. Additionally, labs from 23 showed a low ferritin of 7, TIBC= 444, iron%= 4%, s. iron= 16%, B12- 527.    HCM: - COVID vaccination: - Colonoscopy: Dr. Olmedo- endo/colo 2024 - Gyn: Dr. Olsen - Mammo: Spring 2023 - Lung cancer screen: No   SH: - Occupation:  at [employer] - Living situation: Lives in home with children  - Smoking/Etoh/illicit drugs: Social ETOH, quit smoking > 20 years 1ppd x 8 years, quit vaping - Exercise: Regularly, walking, gym- difficult last few months   FH: Maternal Grandmother-  age 86, Ovarian Cancer Maternal Grandfather-  age 90, esophageal cancer Paternal Grandmother-  lung cancer Paternal Grandfather- , unknown Mother-  age 52, cervical cancer Father-  age 63, Lung cancer Maternal Aunts x2-50's Dx breast Cancer- (one of them also had blood cancer and had uterine cancer)  Maternal cousin- dx in her 30's with breast cancer, now with brain cancer age 50's 1 son- age 15, healthy 1 daughter- age 18, healthy  [de-identified] : Requested to see patient for unscheduled visit on 2/15/24. Received initial Venofer on 2/8/24, 2nd infusion given earlier this morning  At today's visit she reports "not feeling well" on her drive home.  She received 2nd dose of IV iron this morning without incident followed by discharge.  No difficulties after her initial infusion last week.  She did not eat or drink anything prior today's infusion and also did not take her usual metoprolol dose (for irregular heart rhythm).  Has not been sleeping well.  Reports increased stress at home d/t family issues.  Feeling better by the time of interview w/ writer and wants to go home.

## 2024-02-15 NOTE — REASON FOR VISIT
[Follow-Up Visit] : a follow-up visit for [Blood Count Assessment] : blood count assessment [FreeTextEntry2] : iron deficiency anemia

## 2024-02-27 ENCOUNTER — APPOINTMENT (OUTPATIENT)
Dept: BREAST CENTER | Facility: CLINIC | Age: 44
End: 2024-02-27

## 2024-02-29 ENCOUNTER — APPOINTMENT (OUTPATIENT)
Dept: INFUSION THERAPY | Facility: CANCER CENTER | Age: 44
End: 2024-02-29

## 2024-02-29 ENCOUNTER — APPOINTMENT (OUTPATIENT)
Dept: PLASTIC SURGERY | Facility: CLINIC | Age: 44
End: 2024-02-29
Payer: MEDICAID

## 2024-02-29 VITALS
SYSTOLIC BLOOD PRESSURE: 122 MMHG | HEART RATE: 68 BPM | OXYGEN SATURATION: 98 % | WEIGHT: 110 LBS | TEMPERATURE: 97.7 F | BODY MASS INDEX: 20.24 KG/M2 | HEIGHT: 62 IN | DIASTOLIC BLOOD PRESSURE: 60 MMHG

## 2024-02-29 DIAGNOSIS — Z09 ENCOUNTER FOR FOLLOW-UP EXAMINATION AFTER COMPLETED TREATMENT FOR CONDITIONS OTHER THAN MALIGNANT NEOPLASM: ICD-10-CM

## 2024-02-29 PROCEDURE — 99024 POSTOP FOLLOW-UP VISIT: CPT

## 2024-03-08 ENCOUNTER — APPOINTMENT (OUTPATIENT)
Dept: INFUSION THERAPY | Facility: CANCER CENTER | Age: 44
End: 2024-03-08

## 2024-03-08 PROCEDURE — 85027 COMPLETE CBC AUTOMATED: CPT

## 2024-03-08 PROCEDURE — 96365 THER/PROPH/DIAG IV INF INIT: CPT

## 2024-03-08 NOTE — REASON FOR VISIT
[Follow-Up: _____] : a [unfilled] follow-up visit [FreeTextEntry1] : patient states she is doing well and no issues.

## 2024-03-08 NOTE — ASSESSMENT
[FreeTextEntry1] : 42 y/o female for follow up  Healed well Follow up in one month to discuss possible fat grafting to bilateral reconstructed chest.  All questions and concerns addressed. Plan and patient status as per Dr Dunn

## 2024-03-08 NOTE — PHYSICAL EXAM
[de-identified] : Bilateral chest with incisions healed well. Mild tenderness of reconstructed breasts especially under left axilla. No evidence of infection bilaterally.  Rippling of implant noticeable when she leans forward. [de-identified] : no breast, s/p nipple sparing mastectomies. [de-identified] : Bilateral thighs with harvest incision sites pinkish in color.  Some continued numbness above the right knee.

## 2024-03-14 ENCOUNTER — APPOINTMENT (OUTPATIENT)
Dept: INFUSION THERAPY | Facility: CANCER CENTER | Age: 44
End: 2024-03-14

## 2024-03-28 ENCOUNTER — OUTPATIENT (OUTPATIENT)
Dept: OUTPATIENT SERVICES | Facility: HOSPITAL | Age: 44
LOS: 1 days | End: 2024-03-28
Payer: MEDICAID

## 2024-03-28 DIAGNOSIS — E61.1 IRON DEFICIENCY: ICD-10-CM

## 2024-04-19 ENCOUNTER — APPOINTMENT (OUTPATIENT)
Dept: INFUSION THERAPY | Facility: CANCER CENTER | Age: 44
End: 2024-04-19

## 2024-05-16 ENCOUNTER — RESULT REVIEW (OUTPATIENT)
Age: 44
End: 2024-05-16

## 2024-05-16 ENCOUNTER — APPOINTMENT (OUTPATIENT)
Dept: HEMATOLOGY ONCOLOGY | Facility: CLINIC | Age: 44
End: 2024-05-16
Payer: MEDICAID

## 2024-05-16 VITALS
OXYGEN SATURATION: 98 % | DIASTOLIC BLOOD PRESSURE: 67 MMHG | TEMPERATURE: 97 F | SYSTOLIC BLOOD PRESSURE: 105 MMHG | HEART RATE: 67 BPM | RESPIRATION RATE: 16 BRPM

## 2024-05-16 DIAGNOSIS — D50.9 IRON DEFICIENCY ANEMIA, UNSPECIFIED: ICD-10-CM

## 2024-05-16 LAB
BASOPHILS # BLD AUTO: 0.04 K/UL — SIGNIFICANT CHANGE UP (ref 0–0.2)
BASOPHILS NFR BLD AUTO: 0.6 % — SIGNIFICANT CHANGE UP (ref 0–2)
EOSINOPHIL # BLD AUTO: 0.07 K/UL — SIGNIFICANT CHANGE UP (ref 0–0.5)
EOSINOPHIL NFR BLD AUTO: 1.1 % — SIGNIFICANT CHANGE UP (ref 0–6)
HCT VFR BLD CALC: 35.5 % — SIGNIFICANT CHANGE UP (ref 34.5–45)
HGB BLD-MCNC: 12 G/DL — SIGNIFICANT CHANGE UP (ref 11.5–15.5)
IMM GRANULOCYTES NFR BLD AUTO: 0.3 % — SIGNIFICANT CHANGE UP (ref 0–0.9)
LYMPHOCYTES # BLD AUTO: 1.06 K/UL — SIGNIFICANT CHANGE UP (ref 1–3.3)
LYMPHOCYTES # BLD AUTO: 16.9 % — SIGNIFICANT CHANGE UP (ref 13–44)
MCHC RBC-ENTMCNC: 31.9 PG — SIGNIFICANT CHANGE UP (ref 27–34)
MCHC RBC-ENTMCNC: 33.8 GM/DL — SIGNIFICANT CHANGE UP (ref 32–36)
MCV RBC AUTO: 94.4 FL — SIGNIFICANT CHANGE UP (ref 80–100)
MONOCYTES # BLD AUTO: 0.65 K/UL — SIGNIFICANT CHANGE UP (ref 0–0.9)
MONOCYTES NFR BLD AUTO: 10.4 % — SIGNIFICANT CHANGE UP (ref 2–14)
NEUTROPHILS # BLD AUTO: 4.44 K/UL — SIGNIFICANT CHANGE UP (ref 1.8–7.4)
NEUTROPHILS NFR BLD AUTO: 70.7 % — SIGNIFICANT CHANGE UP (ref 43–77)
NRBC # BLD: 0 /100 WBCS — SIGNIFICANT CHANGE UP (ref 0–0)
PLATELET # BLD AUTO: 248 K/UL — SIGNIFICANT CHANGE UP (ref 150–400)
RBC # BLD: 3.76 M/UL — LOW (ref 3.8–5.2)
RBC # FLD: 14.5 % — SIGNIFICANT CHANGE UP (ref 10.3–14.5)
WBC # BLD: 6.28 K/UL — SIGNIFICANT CHANGE UP (ref 3.8–10.5)
WBC # FLD AUTO: 6.28 K/UL — SIGNIFICANT CHANGE UP (ref 3.8–10.5)

## 2024-05-16 PROCEDURE — 99215 OFFICE O/P EST HI 40 MIN: CPT

## 2024-05-16 PROCEDURE — 96365 THER/PROPH/DIAG IV INF INIT: CPT

## 2024-05-16 PROCEDURE — 85027 COMPLETE CBC AUTOMATED: CPT

## 2024-05-16 PROCEDURE — G2211 COMPLEX E/M VISIT ADD ON: CPT | Mod: NC,1L

## 2024-05-17 PROBLEM — D50.9 ANEMIA, IRON DEFICIENCY: Status: ACTIVE | Noted: 2024-02-01

## 2024-05-17 LAB
FERRITIN SERPL-MCNC: 26 NG/ML
FOLATE SERPL-MCNC: 11.3 NG/ML
IRON SATN MFR SERPL: 11 %
IRON SERPL-MCNC: 45 UG/DL
TIBC SERPL-MCNC: 400 UG/DL
UIBC SERPL-MCNC: 354 UG/DL
VIT B12 SERPL-MCNC: 356 PG/ML

## 2024-05-17 NOTE — HISTORY OF PRESENT ILLNESS
[de-identified] : Referred by: PCP PCP: Dr. Michael BURGER JEANNA initially presented at age 43 year on 2024 for evaluation of anemia. She was noted to have a low HGB in 2023. She is reporting heavy menses and recently had a uterine polyp removed by Gyn. Additionally, she reported FUCHS, palpitations, and fatigue. She had colonoscopy and endoscopy with Dr. Olmedo which she states was (-). Denied any dark stools, hematuria or blood per rectum. 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 She is s/p bilateral mastectomy with reconstruction. Laboratory studies from 1/10/24 reviewed and notable for: HGB= 10.6, HCT= 33.4, WBC= 7.42, Ifs=923. Additionally, labs from 23 showed a low ferritin of 7, TIBC= 444, iron%= 4%, s. iron= 16%, B12- 527.  She is s/p 8/10/23 Left SLN Bx after 23 Right prophylactic and left therapeutic mastectomy for right DCIS which showed microinvasion. 23 Surgical path: Right breast SA, negative, mastectomy: papilloma, Left SA is negative. Left mastectomy, 2.1 cm DCIS, IDC.5 mm noted, all margins negative, DCIS closest is 2 mm. ER/MI on core bx was negative/negative. Receptors on microinvasion: ER- 0%, MI- 0%, Her2 positive. 8/10/23 Surgical path: Left sentinel LN. Reactive LN with numerous siderophages. Negative for microscopic metastases with an AE1/AE3 cytokeratin stain. [id number]  HCM: - COVID vaccination: - Colonoscopy: Dr. Olmedo- endo/colo 2024 - Gyn: Dr. Olsen - Mammo: Spring 2023 - Lung cancer screen: No  SH: - Occupation:  at [employer] - Living situation: Lives in home with children - Smoking/Etoh/illicit drugs: Social ETOH, quit smoking > 20 years 1ppd x 8 years, quit vaping - Exercise: Regularly, walking, gym- difficult last few months  FH: Maternal Grandmother-  age 86, Ovarian Cancer Maternal Grandfather-  age 90, esophageal cancer Paternal Grandmother-  lung cancer Paternal Grandfather- , unknown Mother-  age 52, cervical cancer Father-  age 63, Lung cancer Maternal Aunts x2-50's Dx breast Cancer- (one of them also had blood cancer and had uterine cancer) Maternal cousin- dx in her 30's with breast cancer, now with brain cancer age 50's 1 son- age 15, healthy 1 daughter- age 18, healthy.   [de-identified] : Amber presents for follow up for anemia and history of breast cancer on 5/16/24.  Received initial Venofer on 2/8/24.  She is a new patient to me today. Previously seen by Gosia and Inocencia BARR. She has a hx of left breast microinvasive breast cancer. ER- 0%, AL- 0%, Her2 positive. S/p bilateral mastectomy in August 2023. No recommendation for any adjuvant systemic therapy.  She had surgery for SVT ablation 2 weeks ago. She feels fatigued in general.  She also had a D&C and polyp removal recently as well. She continues to have monthly menses. Her period after the D&C was VERY heavy. Occasionally feels lightheaded and "woozy." Denies syncope.   Laboratory studies reviewed at today's visit and notable for: WBC 6.28, Hb 12, plt 248  Iron 45, TIBC 400, Tsat 11%  Ferritin 26

## 2024-05-17 NOTE — PHYSICAL EXAM
[Fully active, able to carry on all pre-disease performance without restriction] : Status 0 - Fully active, able to carry on all pre-disease performance without restriction [Normal] : affect appropriate [de-identified] : generally well appearing female, appears fatigued  [de-identified] : declined breast exam today

## 2024-05-17 NOTE — RESULTS/DATA
[FreeTextEntry1] : History of iron deficiency, likely in setting of very heavy menses.  Began IV Venofer infusions on 2/8/24.  Her ferritin has improved slightly but she remains symptomatic after recent D&C.  Would recommend additional course of IV iron at this time.   Left breast microinvasive breast cancer. ER- 0%, DC- 0%, Her2 positive. S/p bilateral mastectomy in August 2023. No recommendation for any adjuvant systemic therapy.  Continue routine f/u with breast surgery and plastic surgery.   RTC 2 months for follow up.   I personally have spent a total of 45 minutes of time on the date of this encounter reviewing test results, documenting findings, coordinating care and directly consulting with the patient and/or designated family member. Greater than 50% of the face to face encounter time was spent on counseling and/or coordination of care for breast cancer, iron deficiency.

## 2024-05-30 ENCOUNTER — OUTPATIENT (OUTPATIENT)
Dept: OUTPATIENT SERVICES | Facility: HOSPITAL | Age: 44
LOS: 1 days | End: 2024-05-30
Payer: MEDICAID

## 2024-05-30 DIAGNOSIS — E61.1 IRON DEFICIENCY: ICD-10-CM

## 2024-06-06 ENCOUNTER — APPOINTMENT (OUTPATIENT)
Dept: INFUSION THERAPY | Facility: CANCER CENTER | Age: 44
End: 2024-06-06

## 2024-06-11 ENCOUNTER — APPOINTMENT (OUTPATIENT)
Dept: BREAST CENTER | Facility: CLINIC | Age: 44
End: 2024-06-11
Payer: MEDICAID

## 2024-06-11 VITALS
OXYGEN SATURATION: 100 % | BODY MASS INDEX: 20.28 KG/M2 | TEMPERATURE: 99.1 F | SYSTOLIC BLOOD PRESSURE: 119 MMHG | HEIGHT: 62 IN | DIASTOLIC BLOOD PRESSURE: 73 MMHG | HEART RATE: 67 BPM | WEIGHT: 110.19 LBS

## 2024-06-11 DIAGNOSIS — N64.4 MASTODYNIA: ICD-10-CM

## 2024-06-11 DIAGNOSIS — D05.12 INTRADUCTAL CARCINOMA IN SITU OF LEFT BREAST: ICD-10-CM

## 2024-06-11 PROCEDURE — 99214 OFFICE O/P EST MOD 30 MIN: CPT

## 2024-06-11 NOTE — PAST MEDICAL HISTORY
[Menarche Age ____] : age at menarche was [unfilled] [Definite ___ (Date)] : the last menstrual period was [unfilled] [Total Preg ___] : G[unfilled] [Living ___] : Living: [unfilled] [Age At Live Birth ___] : Age at live birth: [unfilled] [FreeTextEntry6] : no [FreeTextEntry7] : no [FreeTextEntry8] : no

## 2024-06-11 NOTE — ASSESSMENT
[FreeTextEntry1] : 43-year-old, myriad negative, female here today for acute visit due to new onset axillary pain? 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 8/10/23 Surgical path: Left sentinel LN. Reactive LN with numerous siderophages (H T E and iron stain). Negative for microscopic metastases with an AE1/AE3 cytokeratin stain. [id number] 7/21/23 Surgical path: Right breast SA, negative, mastectomy: papilloma, Left SA is negative. Left mastectomy, 2.1 cm DCIS, IDC.5 mm noted, all margins negative, DCIS closest is 2 mm. ER/MT on core bx was negative/negative. Receptors on microinvasion: ER- 0%, MT- 0%, Her2 positive.  Implants placed and fat grafting 1/24- she notes implant dimpling and right breast with discoloration from magtrace. Recent dx of anemia, receiving iron infusions with Dr. Edmond, ongoing heavy menses despite hysteroscopy, being followed by [facility]. She notes she works as a  and this is her busiest season so it's likely she went back to work too soon which she feels is attributing to ongoing bleeding. Additional had an ablation with [facility] for tachycardia but noted on going tachycardia.  Fhx: breast ca: mAunt x2-50's (one of them also had blood cancer), Mcousin 30's (also had lung cancer), Ovarian cancer MGM 86, Lung, MGF 80, MGM ?age, cervical Mother 49 y/o, Muncle ? age. Pt has one daughter and one son.  CBE: Bilat mastectomy, Right PA discoloration from magtrace>Left side. Left axillary inc is intact and healed. Some rippling from implant.  Full ROM and no lymphedema.  Pt still feels some axillary discomfort. May be attributed to overuse at work but will check axillary u/s. No suspicious findings on CBE. ALso referred to Dr. Josue for pulling. Rec weight gain given she is too busy to eat and work and continues to lose weight.  PLAN: 1 year out, bilat mastectomy F.u with Dr. Dunn as scheduled. F/u with Dr. Edmond. Left axillary u/s, prob MSK but pt feels discomfort.  Pt due for SOZO, aware to have this at cancer center at next iron infusion or stop in our office while in [place].

## 2024-06-11 NOTE — PHYSICAL EXAM
[Normocephalic] : normocephalic [Atraumatic] : atraumatic [Supple] : supple [No Supraclavicular Adenopathy] : no supraclavicular adenopathy [Examined in the supine and seated position] : examined in the supine and seated position [Asymmetrical] : asymmetrical [Bra Size: ___] : Bra Size: [unfilled] [No Nipple Retraction] : no left nipple retraction [No Nipple Discharge] : no left nipple discharge [No Axillary Lymphadenopathy] : no left axillary lymphadenopathy [No Edema] : no edema [No Swelling] : no swelling [Full ROM] : full range of motion [No Rashes] : no rashes [No Ulceration] : no ulceration [No Thyromegaly] : no thyromegaly [de-identified] : Bilat mastectomy with implants, some rippling. Right PA discoloration from magtrace > left.  [TextEntry] : Psych: Appropriate, NAD, A&Ox3 Neuro: Intact grossly, gait normal

## 2024-06-11 NOTE — HISTORY OF PRESENT ILLNESS
[FreeTextEntry1] : Referred by Dr. Francisco Rodriguez.  43-year-old, myriad negative, female here today f.u. 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 8/10/23 Surgical path: Left sentinel LN. Reactive LN with numerous siderophages (H T E and iron stain). Negative for microscopic metastases with an AE1/AE3 cytokeratin stain. [id number] 7/21/23 Surgical path: Right breast SA, negative, mastectomy: papilloma, Left SA is negative. Left mastectomy, 2.1 cm DCIS, IDC.5 mm noted, all margins negative, DCIS closest is 2 mm. ER/MA on core bx was negative/negative. Receptors on microinvasion: ER- 0%, MA- 0%, Her2 positive.  Implants placed and fat grafting 1/24- she notes implant dimpling and right breast with discoloration from magtrace.  Pt also c/o continued left axillary discomfort.  Recent dx of anemia, receiving iron infusions with Dr. Gilbert, ongoing heavy menses despite hysteroscopy. Pt also has DUB, and is seeing [facility]. She notes she works as a  and this is her busiest season so it's likely she went back to work too soon.  Additional had an ablation with [facility] for tachycardia but noted on going tachycardia.   Fhx: breast ca: mAunt x2-50's (one of them also had blood cancer), Mcousin 30's (also had lung cancer), Ovarian cancer MGM 86, Lung, MGF 80, MGM ?age, cervical Mother 49 y/o, Muncle ? age. Pt has one daughter and one son.

## 2024-06-11 NOTE — REVIEW OF SYSTEMS
[Recent Weight Loss (___ Lbs)] : recent [unfilled] ~Ulb weight loss [Heart Rate Is Fast] : fast heart rate [Abn Vaginal Bleeding] : unexplained vaginal bleeding [As Noted in HPI] : as noted in HPI [Negative] : Heme/Lymph [FreeTextEntry2] : Pt doesn't have time to eat at work.

## 2024-06-11 NOTE — CONSULT LETTER
[Dear  ___] : Dear  [unfilled], [Courtesy Letter:] : I had the pleasure of seeing your patient, [unfilled], in my office today. [Please see my note below.] : Please see my note below. [Consult Closing:] : Thank you very much for allowing me to participate in the care of this patient.  If you have any questions, please do not hesitate to contact me. [Sincerely,] : Sincerely, [FreeTextEntry3] : Jacklyn Mccarthy MD

## 2024-06-13 ENCOUNTER — APPOINTMENT (OUTPATIENT)
Dept: INFUSION THERAPY | Facility: CANCER CENTER | Age: 44
End: 2024-06-13

## 2024-06-20 ENCOUNTER — APPOINTMENT (OUTPATIENT)
Dept: INFUSION THERAPY | Facility: CANCER CENTER | Age: 44
End: 2024-06-20

## 2024-06-26 ENCOUNTER — APPOINTMENT (OUTPATIENT)
Dept: PLASTIC SURGERY | Facility: CLINIC | Age: 44
End: 2024-06-26
Payer: MEDICAID

## 2024-06-26 VITALS
HEIGHT: 62 IN | WEIGHT: 112 LBS | DIASTOLIC BLOOD PRESSURE: 62 MMHG | BODY MASS INDEX: 20.61 KG/M2 | SYSTOLIC BLOOD PRESSURE: 102 MMHG

## 2024-06-26 DIAGNOSIS — Z90.13 ACQUIRED ABSENCE OF BILATERAL BREASTS AND NIPPLES: ICD-10-CM

## 2024-06-26 DIAGNOSIS — N65.0 DEFORMITY OF RECONSTRUCTED BREAST: ICD-10-CM

## 2024-06-26 PROCEDURE — 99213 OFFICE O/P EST LOW 20 MIN: CPT

## 2024-06-27 ENCOUNTER — APPOINTMENT (OUTPATIENT)
Dept: INFUSION THERAPY | Facility: CANCER CENTER | Age: 44
End: 2024-06-27

## 2024-07-05 ENCOUNTER — APPOINTMENT (OUTPATIENT)
Dept: INFUSION THERAPY | Facility: CANCER CENTER | Age: 44
End: 2024-07-05

## 2024-07-05 PROCEDURE — 96365 THER/PROPH/DIAG IV INF INIT: CPT

## 2024-08-02 ENCOUNTER — OUTPATIENT (OUTPATIENT)
Dept: OUTPATIENT SERVICES | Facility: HOSPITAL | Age: 44
LOS: 1 days | End: 2024-08-02

## 2024-08-02 DIAGNOSIS — E61.1 IRON DEFICIENCY: ICD-10-CM

## 2024-08-09 ENCOUNTER — APPOINTMENT (OUTPATIENT)
Dept: INFUSION THERAPY | Facility: CANCER CENTER | Age: 44
End: 2024-08-09

## 2024-09-11 DIAGNOSIS — D50.9 IRON DEFICIENCY ANEMIA, UNSPECIFIED: ICD-10-CM

## 2024-09-11 NOTE — HISTORY OF PRESENT ILLNESS
[de-identified] : Referred by: PCP PCP: Dr. Michael BURGER JEANNA initially presented at age 43 year on 2024 for evaluation of anemia. She was noted to have a low HGB in 2023. She is reporting heavy menses and recently had a uterine polyp removed by Gyn. Additionally, she reported FUCHS, palpitations, and fatigue. She had colonoscopy and endoscopy with Dr. Olmedo which she states was (-). Denied any dark stools, hematuria or blood per rectum. 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 She is s/p bilateral mastectomy with reconstruction. Laboratory studies from 1/10/24 reviewed and notable for: HGB= 10.6, HCT= 33.4, WBC= 7.42, Gel=243. Additionally, labs from 23 showed a low ferritin of 7, TIBC= 444, iron%= 4%, s. iron= 16%, B12- 527.  She is s/p 8/10/23 Left SLN Bx after 23 Right prophylactic and left therapeutic mastectomy for right DCIS which showed microinvasion. 23 Surgical path: Right breast SA, negative, mastectomy: papilloma, Left SA is negative. Left mastectomy, 2.1 cm DCIS, IDC.5 mm noted, all margins negative, DCIS closest is 2 mm. ER/PA on core bx was negative/negative. Receptors on microinvasion: ER- 0%, PA- 0%, Her2 positive. 8/10/23 Surgical path: Left sentinel LN. Reactive LN with numerous siderophages. Negative for microscopic metastases with an AE1/AE3 cytokeratin stain. [id number]  HCM: - COVID vaccination: - Colonoscopy: Dr. Olmedo- endo/colo 2024 - Gyn: Dr. Olsen - Mammo: Spring 2023 - Lung cancer screen: No  SH: - Occupation:  at [employer] - Living situation: Lives in home with children - Smoking/Etoh/illicit drugs: Social ETOH, quit smoking > 20 years 1ppd x 8 years, quit vaping - Exercise: Regularly, walking, gym- difficult last few months  FH: Maternal Grandmother-  age 86, Ovarian Cancer Maternal Grandfather-  age 90, esophageal cancer Paternal Grandmother-  lung cancer Paternal Grandfather- , unknown Mother-  age 52, cervical cancer Father-  age 63, Lung cancer Maternal Aunts x2-50's Dx breast Cancer- (one of them also had blood cancer and had uterine cancer) Maternal cousin- dx in her 30's with breast cancer, now with brain cancer age 50's 1 son- age 15, healthy 1 daughter- age 18, healthy. [de-identified] : Amber presents for follow up for anemia and breast cancer on 9/12/24. Received initial Venofer on 2/8/24.  She has a hx of left breast microinvasive breast cancer. ER- 0%, [place]- 0%, Her2 positive. S/p bilateral mastectomy in August 2023. No recommendation for any adjuvant systemic therapy. She had surgery for SVT ablation 2 weeks ago. She feels fatigued in general. She also had a D&C and polyp removal recently as well. She continues to have monthly menses. Her period after the D&C was VERY heavy. Occasionally feels lightheaded and "woozy." Denies syncope.  Laboratory studies reviewed at today's visit and notable for: ***   check iron studies

## 2024-09-12 ENCOUNTER — APPOINTMENT (OUTPATIENT)
Dept: HEMATOLOGY ONCOLOGY | Facility: CLINIC | Age: 44
End: 2024-09-12

## 2024-09-30 NOTE — HISTORY OF PRESENT ILLNESS
[FreeTextEntry1] : "I feel ok"  Patient presents for follow up and states that she feels much better and is happy with having her tissue expanders out.  However, she states she continues to have some discomfort under her left arm where the lymph nodes were taken.  She also states that she sees rippling of her implants through her breast skin and is not happy with it.  She has lost five pounds since the surgery.  She is back to work and is very happy about it. She has no other complaints. 
English

## 2024-10-15 ENCOUNTER — APPOINTMENT (OUTPATIENT)
Dept: PLASTIC SURGERY | Facility: CLINIC | Age: 44
End: 2024-10-15
Payer: MEDICAID

## 2024-10-15 PROCEDURE — 99213 OFFICE O/P EST LOW 20 MIN: CPT

## 2024-10-21 ENCOUNTER — APPOINTMENT (OUTPATIENT)
Dept: PLASTIC SURGERY | Facility: CLINIC | Age: 44
End: 2024-10-21
Payer: MEDICAID

## 2024-10-21 VITALS
HEART RATE: 67 BPM | WEIGHT: 110 LBS | TEMPERATURE: 98.1 F | OXYGEN SATURATION: 98 % | SYSTOLIC BLOOD PRESSURE: 110 MMHG | BODY MASS INDEX: 20.24 KG/M2 | HEIGHT: 62 IN | DIASTOLIC BLOOD PRESSURE: 68 MMHG

## 2024-10-21 DIAGNOSIS — N65.0 DEFORMITY OF RECONSTRUCTED BREAST: ICD-10-CM

## 2024-10-21 DIAGNOSIS — Z90.13 ACQUIRED ABSENCE OF BILATERAL BREASTS AND NIPPLES: ICD-10-CM

## 2024-10-21 DIAGNOSIS — Z86.79 PERSONAL HISTORY OF OTHER DISEASES OF THE CIRCULATORY SYSTEM: ICD-10-CM

## 2024-10-21 PROCEDURE — 99213 OFFICE O/P EST LOW 20 MIN: CPT

## 2024-10-24 ENCOUNTER — NON-APPOINTMENT (OUTPATIENT)
Age: 44
End: 2024-10-24

## 2024-11-15 ENCOUNTER — APPOINTMENT (OUTPATIENT)
Dept: PLASTIC SURGERY | Facility: HOSPITAL | Age: 44
End: 2024-11-15

## 2025-04-14 ENCOUNTER — OUTPATIENT (OUTPATIENT)
Dept: OUTPATIENT SERVICES | Facility: HOSPITAL | Age: 45
LOS: 1 days | End: 2025-04-14
Payer: MEDICAID

## 2025-04-14 DIAGNOSIS — E61.1 IRON DEFICIENCY: ICD-10-CM

## 2025-04-21 ENCOUNTER — APPOINTMENT (OUTPATIENT)
Dept: HEMATOLOGY ONCOLOGY | Facility: CLINIC | Age: 45
End: 2025-04-21
Payer: MEDICAID

## 2025-04-21 ENCOUNTER — RESULT REVIEW (OUTPATIENT)
Age: 45
End: 2025-04-21

## 2025-04-21 ENCOUNTER — NON-APPOINTMENT (OUTPATIENT)
Age: 45
End: 2025-04-21

## 2025-04-21 VITALS
SYSTOLIC BLOOD PRESSURE: 115 MMHG | WEIGHT: 114 LBS | OXYGEN SATURATION: 100 % | HEIGHT: 62 IN | HEART RATE: 73 BPM | DIASTOLIC BLOOD PRESSURE: 69 MMHG | TEMPERATURE: 98.1 F | BODY MASS INDEX: 20.98 KG/M2

## 2025-04-21 DIAGNOSIS — D50.9 IRON DEFICIENCY ANEMIA, UNSPECIFIED: ICD-10-CM

## 2025-04-21 DIAGNOSIS — D05.12 INTRADUCTAL CARCINOMA IN SITU OF LEFT BREAST: ICD-10-CM

## 2025-04-21 PROCEDURE — G2211 COMPLEX E/M VISIT ADD ON: CPT | Mod: NC

## 2025-04-21 PROCEDURE — 85027 COMPLETE CBC AUTOMATED: CPT

## 2025-04-21 PROCEDURE — 99213 OFFICE O/P EST LOW 20 MIN: CPT

## 2025-04-22 ENCOUNTER — APPOINTMENT (OUTPATIENT)
Dept: BREAST CENTER | Facility: CLINIC | Age: 45
End: 2025-04-22
Payer: MEDICAID

## 2025-04-22 VITALS
DIASTOLIC BLOOD PRESSURE: 74 MMHG | WEIGHT: 110.25 LBS | OXYGEN SATURATION: 99 % | TEMPERATURE: 98.6 F | SYSTOLIC BLOOD PRESSURE: 109 MMHG | BODY MASS INDEX: 20.29 KG/M2 | HEART RATE: 68 BPM | HEIGHT: 62 IN

## 2025-04-22 DIAGNOSIS — C50.512 MALIGNANT NEOPLASM OF LOWER-OUTER QUADRANT OF LEFT FEMALE BREAST: ICD-10-CM

## 2025-04-22 DIAGNOSIS — Z17.1 MALIGNANT NEOPLASM OF LOWER-OUTER QUADRANT OF LEFT FEMALE BREAST: ICD-10-CM

## 2025-04-22 DIAGNOSIS — Z78.9 OTHER SPECIFIED HEALTH STATUS: ICD-10-CM

## 2025-04-22 DIAGNOSIS — Z80.3 FAMILY HISTORY OF MALIGNANT NEOPLASM OF BREAST: ICD-10-CM

## 2025-04-22 LAB
FERRITIN SERPL-MCNC: 34 NG/ML
FOLATE SERPL-MCNC: 16.9 NG/ML
IRON SATN MFR SERPL: 16 %
IRON SERPL-MCNC: 62 UG/DL
TIBC SERPL-MCNC: 379 UG/DL
UIBC SERPL-MCNC: 318 UG/DL
VIT B12 SERPL-MCNC: 548 PG/ML

## 2025-04-22 PROCEDURE — 99214 OFFICE O/P EST MOD 30 MIN: CPT

## 2025-05-02 ENCOUNTER — APPOINTMENT (OUTPATIENT)
Dept: RADIOLOGY | Facility: CLINIC | Age: 45
End: 2025-05-02